# Patient Record
Sex: MALE | Race: BLACK OR AFRICAN AMERICAN | NOT HISPANIC OR LATINO | Employment: FULL TIME | URBAN - METROPOLITAN AREA
[De-identification: names, ages, dates, MRNs, and addresses within clinical notes are randomized per-mention and may not be internally consistent; named-entity substitution may affect disease eponyms.]

---

## 2017-05-05 ENCOUNTER — OFFICE VISIT (OUTPATIENT)
Dept: FAMILY MEDICINE | Facility: CLINIC | Age: 22
End: 2017-05-05
Payer: COMMERCIAL

## 2017-05-05 VITALS
WEIGHT: 183 LBS | BODY MASS INDEX: 24.25 KG/M2 | HEART RATE: 41 BPM | OXYGEN SATURATION: 100 % | DIASTOLIC BLOOD PRESSURE: 64 MMHG | TEMPERATURE: 98 F | SYSTOLIC BLOOD PRESSURE: 127 MMHG | HEIGHT: 73 IN

## 2017-05-05 DIAGNOSIS — M54.50 LOW BACK PAIN WITHOUT SCIATICA, UNSPECIFIED BACK PAIN LATERALITY, UNSPECIFIED CHRONICITY: ICD-10-CM

## 2017-05-05 DIAGNOSIS — Z00.00 ROUTINE GENERAL MEDICAL EXAMINATION AT A HEALTH CARE FACILITY: Primary | ICD-10-CM

## 2017-05-05 DIAGNOSIS — K40.90 UNILATERAL INGUINAL HERNIA WITHOUT OBSTRUCTION OR GANGRENE, RECURRENCE NOT SPECIFIED: ICD-10-CM

## 2017-05-05 PROCEDURE — 99395 PREV VISIT EST AGE 18-39: CPT | Performed by: PHYSICIAN ASSISTANT

## 2017-05-05 NOTE — PROGRESS NOTES
SUBJECTIVE:     CC: Mike Awan is an 22 year old male who presents for preventative health visit.     Healthy Habits:    Do you get at least three servings of calcium containing foods daily (dairy, green leafy vegetables, etc.)? yes    Amount of exercise or daily activities, outside of work: 5 day(s) per week    Problems taking medications regularly No    Medication side effects: No    Have you had an eye exam in the past two years? yes    Do you see a dentist twice per year? no    Do you have sleep apnea, excessive snoring or daytime drowsiness?no            Today's PHQ-2 Score:   PHQ-2 ( 1999 [employer]) 5/5/2017 8/23/2016   Q1: Little interest or pleasure in doing things 0 0   Q2: Feeling down, depressed or hopeless 0 0   PHQ-2 Score 0 0       Abuse: Current or Past(Physical, Sexual or Emotional)- No  Do you feel safe in your environment - Yes    Social History   Substance Use Topics     Smoking status: Never Smoker     Smokeless tobacco: Never Used     Alcohol use No     The patient does not drink >3 drinks per day nor >7 drinks per week.    Last PSA: No results found for: PSA    No results for input(s): CHOL, HDL, LDL, TRIG, CHOLHDLRATIO, NHDL in the last 35010 hours.    Reviewed orders with patient. Reviewed health maintenance and updated orders accordingly - Yes    Reviewed and updated as needed this visit by clinical staff  Tobacco  Allergies  Meds  Med Hx  Surg Hx  Fam Hx  Soc Hx        Reviewed and updated as needed this visit by Provider  Tobacco  Allergies  Meds  Soc Hx           ROS:  CONSTITUTIONAL:intentional weight loss.   I: NEGATIVE for worrisome rashes, moles or lesions  E: NEGATIVE for vision changes or irritation  ENT: NEGATIVE for ear, mouth and throat problems  R: NEGATIVE for significant cough or SOB  CV: NEGATIVE for chest pain, palpitations or peripheral edema  GI: NEGATIVE for nausea, abdominal pain, heartburn, or change in bowel habits   male: negative for dysuria, hematuria,  "decreased urinary stream, erectile dysfunction, urethral discharge  MUSCULOSKELETAL:low back pain on and off  N: NEGATIVE for weakness, dizziness or paresthesias  P: NEGATIVE for changes in mood or affect    Problem list, Medication list, Allergies, and Medical/Social/Surgical histories reviewed in EPIC and updated as appropriate.  OBJECTIVE:     /64 (BP Location: Left arm, Patient Position: Chair, Cuff Size: Adult Regular)  Pulse (!) 41  Temp 98  F (36.7  C) (Oral)  Ht 6' 1.46\" (1.866 m)  Wt 183 lb (83 kg)  SpO2 100%  BMI 23.84 kg/m2  EXAM:  GENERAL: healthy, alert and no distress  EYES: Eyes grossly normal to inspection, PERRL and conjunctivae and sclerae normal  HENT: ear canals and TM's normal, nose and mouth without ulcers or lesions  NECK: no adenopathy, no asymmetry, masses, or scars and thyroid normal to palpation  RESP: lungs clear to auscultation - no rales, rhonchi or wheezes  CV: regular rate and rhythm, normal S1 S2, no S3 or S4, no murmur, click or rub, no peripheral edema and peripheral pulses strong  ABDOMEN: soft, nontender, no hepatosplenomegaly, no masses and bowel sounds normal   (male): testicles normal without atrophy or masses, hernia small left with cough and penis normal without urethral discharge  MS: normal rom of back, negative stl raise, normal DTR knees bilaterally, equal strength in lower extremities, no pain on palpation.    SKIN: no suspicious lesions or rashes  NEURO: Normal strength and tone, mentation intact and speech normal  PSYCH: mentation appears normal, affect normal/bright    ASSESSMENT/PLAN:     1. Routine general medical examination at a health care facility  Overall healthy.  High blood pressure has resolved, likely due to weight loss.      2. Low back pain without sciatica, unspecified back pain laterality, unspecified chronicity  He will consider PT if covered by insurance.    - [facility] CHIR[facility]RACTIC REFERRAL    3. Unilateral inguinal hernia " "without obstruction or gangrene, recurrence not specified  Monitor.  Not bothersome at this time      COUNSELING:  Reviewed preventive health counseling, as reflected in patient instructions       Regular exercise       Healthy diet/nutrition       Safe sex practices/STD prevention         reports that he has never smoked. He has never used smokeless tobacco.    Estimated body mass index is 23.84 kg/(m^2) as calculated from the following:    Height as of this encounter: 6' 1.46\" (1.866 m).    Weight as of this encounter: 183 lb (83 kg).       Counseling Resources:  ATP IV Guidelines  Pooled Cohorts Equation Calculator  FRAX Risk Assessment  ICSI Preventive Guidelines  Dietary Guidelines for Americans, 2010  USDA's MyPlate  ASA Prophylaxis  Lung CA Screening    Criss Helton PA-C  [facility]  "

## 2017-05-05 NOTE — NURSING NOTE
"Chief Complaint   Patient presents with     Physical       Initial /64 (BP Location: Left arm, Patient Position: Chair, Cuff Size: Adult Regular)  Pulse (!) 41  Temp 98  F (36.7  C) (Oral)  Ht 6' 1.46\" (1.866 m)  Wt 183 lb (83 kg)  SpO2 100%  BMI 23.84 kg/m2 Estimated body mass index is 23.84 kg/(m^2) as calculated from the following:    Height as of this encounter: 6' 1.46\" (1.866 m).    Weight as of this encounter: 183 lb (83 kg).  Medication Reconciliation: complete   Selena Dean CMA       "

## 2017-05-05 NOTE — MR AVS SNAPSHOT
After Visit Summary   5/5/2017    Mike Awan    MRN: [id number]           Patient Information     Date Of Birth          1995        Visit Information        Provider Department      5/5/2017 11:20 AM Criss Helton PA-C [facility]        Today's Diagnoses     Routine general medical examination at a health care facility    -  1    Low back pain without sciatica, unspecified back pain laterality, unspecified chronicity        Unilateral inguinal hernia without obstruction or gangrene, recurrence not specified          Care Instructions      Preventive Health Recommendations  Male Ages 18 - 25     Yearly exam:             See your health care provider every year in order to  o   Review health changes.   o   Discuss preventive care.    o   Review your medicines if your doctor has prescribed any.    You should be tested each year for STDs (sexually transmitted diseases).     Talk to your provider about cholesterol testing.      If you are at risk for diabetes, you should have a diabetes test (fasting glucose).    Shots: Get a flu shot each year. Get a tetanus shot every 10 years.     Nutrition:    Eat at least 5 servings of fruits and vegetables daily.     Eat whole-grain bread, whole-wheat pasta and brown rice instead of white grains and rice.     Talk to your provider about calcium and Vitamin D.     Lifestyle    Exercise for at least 150 minutes a week (30 minutes a day, 5 days a week). This will help you control your weight and prevent disease.     Limit alcohol to one drink per day.     No smoking.     Wear sunscreen to prevent skin cancer.     See your dentist every six months for an exam and cleaning.           Follow-ups after your visit        Additional Services     [facility], HAND, AND CHIROPRACTIC REFERRAL       **This order will print in the [facility] Scheduling Office**    Physical Therapy, Hand Therapy and Chiropractic Care are available through:    *[facility]  *[facility]  *[facility]    Call one number to schedule at any of the above locations: [phone].    Your provider has referred you to: Physical Therapy at [facility] or [facility]    Indication/Reason for Referral: Low Back Pain  Onset of Illness: several months  Therapy Orders: Evaluate and Treat  Special Programs: None  Special Request: None    Kay Villanueva      Additional Comments for the Therapist or Chiropractor:     Please be aware that coverage of these services is subject to the terms and limitations of your health insurance plan.  Call member services at your health plan with any benefit or coverage questions.      Please bring the following to your appointment:    *Your personal calendar for scheduling future appointments  *Comfortable clothing                  Who to contact     If you have questions or need follow up information about today's clinic visit or your schedule please contact [facility] directly at [phone].  Normal or non-critical lab and imaging results will be communicated to you by MyChart, letter or phone within 4 business days after the clinic has received the results. If you do not hear from us within 7 days, please contact the clinic through MyChart or phone. If you have a critical or abnormal lab result, we will notify you by phone as soon as possible.  Submit refill requests through [employer] or call your pharmacy and they will forward the refill request to us. Please allow 3 business days for your refill to be completed.          Additional Information About Your Visit        [facility] Information     [employer] lets you send messages to your doctor, view your test results, renew your prescriptions, schedule appointments and more. To sign up, go to [email or web address] . Click on \"Log in\" on the left side of the screen, which will take you to the Welcome page. Then click on \"Sign up Now\" on the right side " "of the page.     You will be asked to enter the access code listed below, as well as some personal information. Please follow the directions to create your username and password.     Your access code is: [email or web address]  Expires: 8/3/2017 12:08 PM     Your access code will  in 90 days. If you need help or a new code, please call your [place] clinic or [phone].        Care EveryWhere ID     This is your Care EveryWhere ID. This could be used by other organizations to access your [place] medical records  [id number]        Your Vitals Were     Pulse Temperature Height Pulse Oximetry BMI (Body Mass Index)       41 98  F (36.7  C) (Oral) 6' 1.46\" (1.866 m) 100% 23.84 kg/m2        Blood Pressure from Last 3 Encounters:   17 127/64   16 122/75   16 144/66    Weight from Last 3 Encounters:   17 183 lb (83 kg)   16 179 lb (81.2 kg)   14 248 lb 8 oz (112.7 kg) (>99 %)*     * Growth percentiles are based on [facility] 2-20 Years data.              We Performed the Following     [facility] PT, HAND, AND CHIROPRACTIC REFERRAL        Primary Care Provider Office Phone # Fax #    Criss Helton PA-C [phone]       [facility] [address] [facility] [address]        Thank you!     Thank you for choosing [facility]  for your care. Our goal is always to provide you with excellent care. Hearing back from our patients is one way we can continue to improve our services. Please take a few minutes to complete the written survey that you may receive in the mail after your visit with us. Thank you!             Your Updated Medication List - Protect others around you: Learn how to safely use, store and throw away your medicines at www.disposemymeds.org.          This list is accurate as of: 17 12:08 PM.  Always use your most recent med list.                   Brand Name Dispense Instructions for use    ibuprofen 600 MG tablet    " ADVIL/MOTRIN    30 tablet    Take 1 tablet (600 mg) by mouth every 8 hours as needed for pain       TYLENOL PO      Take 650 mg by mouth every 8 hours as needed for mild pain or fever Reported on 5/5/2017

## 2017-05-23 ENCOUNTER — THERAPY VISIT (OUTPATIENT)
Dept: PHYSICAL THERAPY | Facility: CLINIC | Age: 22
End: 2017-05-23
Payer: COMMERCIAL

## 2017-05-23 DIAGNOSIS — M54.50 BILATERAL LOW BACK PAIN WITHOUT SCIATICA, UNSPECIFIED CHRONICITY: Primary | ICD-10-CM

## 2017-05-23 PROCEDURE — 97110 THERAPEUTIC EXERCISES: CPT | Mod: GP | Performed by: PHYSICAL THERAPIST

## 2017-05-23 PROCEDURE — 97112 NEUROMUSCULAR REEDUCATION: CPT | Mod: GP | Performed by: PHYSICAL THERAPIST

## 2017-05-23 PROCEDURE — 97161 PT EVAL LOW COMPLEX 20 MIN: CPT | Mod: GP | Performed by: PHYSICAL THERAPIST

## 2017-05-23 NOTE — MR AVS SNAPSHOT
"              After Visit Summary   5/23/2017    Mike Awan    MRN: [id number]           Patient Information     Date Of Birth          1995        Visit Information        Provider Department      5/23/2017 9:50 AM Brandy Palma, PT [facility]        Today's Diagnoses     Bilateral low back pain without sciatica, unspecified chronicity    -  1       Follow-ups after your visit        Your next 10 appointments already scheduled     May 30, 2017  7:50 AM CDT   [facility] with Brandy Palma PT   [facility] ([facility])    [address]  [facility] [address] [phone]              Who to contact     If you have questions or need follow up information about today's clinic visit or your schedule please contact [facility] directly at [phone].  Normal or non-critical lab and imaging results will be communicated to you by MyChart, letter or phone within 4 business days after the clinic has received the results. If you do not hear from us within 7 days, please contact the clinic through MyChart or phone. If you have a critical or abnormal lab result, we will notify you by phone as soon as possible.  Submit refill requests through [employer] or call your pharmacy and they will forward the refill request to us. Please allow 3 business days for your refill to be completed.          Additional Information About Your Visit        MyChart Information     [employer] lets you send messages to your doctor, view your test results, renew your prescriptions, schedule appointments and more. To sign up, go to [email or web address] . Click on \"Log in\" on the left side of the screen, which will take you to the Welcome page. Then click on \"Sign up Now\" on the right side of the page.     You will be asked to enter the access code listed below, as well as some personal information. Please " follow the directions to create your username and password.     Your access code is: [email or web address]  Expires: 8/3/2017 12:08 PM     Your access code will  in 90 days. If you need help or a new code, please call your [place] clinic or [phone].        Care EveryWhere ID     This is your Care EveryWhere ID. This could be used by other organizations to access your [place] medical records  [id number]         Blood Pressure from Last 3 Encounters:   17 127/64   16 122/75   16 144/66    Weight from Last 3 Encounters:   17 83 kg (183 lb)   16 81.2 kg (179 lb)   14 112.7 kg (248 lb 8 oz) (>99 %)*     * Growth percentiles are based on [facility] 2-20 Years data.              We Performed the Following     HC PT EVAL, LOW COMPLEXITY     NEUROMUSCULAR RE-EDUCATION     THERAPEUTIC EXERCISES        Primary Care Provider Office Phone # Fax #    Criss Helton PA-C [phone]       [facility] [address]  [facility] [place] [address]        Thank you!     Thank you for choosing [facility]  for your care. Our goal is always to provide you with excellent care. Hearing back from our patients is one way we can continue to improve our services. Please take a few minutes to complete the written survey that you may receive in the mail after your visit with us. Thank you!             Your Updated Medication List - Protect others around you: Learn how to safely use, store and throw away your medicines at www.disposemymeds.org.          This list is accurate as of: 17 12:28 PM.  Always use your most recent med list.                   Brand Name Dispense Instructions for use    ibuprofen 600 MG tablet    ADVIL/MOTRIN    30 tablet    Take 1 tablet (600 mg) by mouth every 8 hours as needed for pain       TYLENOL PO      Take 650 mg by mouth every 8 hours as needed for mild pain or fever Reported on 2017

## 2017-05-23 NOTE — PROGRESS NOTES
"[facility] Initial Evaluation    Subjective:    Patient is a 22 year old male presenting with rehab back hpi. The history is provided by the patient.   Mike Awan is a 22 year old male with a lumbar condition.  Condition occurred with:  Insidious onset.  Condition occurred: for unknown reasons.  This is a recurrent condition  Pain began about one week ago. Has had recurrent back pain in the past but has not lasted this long. PT referral on 5/5/17.    Patient reports pain:  Lower lumbar spine, central lumbar spine, lumbar spine right and lumbar spine left.  Radiates to:  No radiation.  Pain is described as other (\"tightness, twisting\") and is intermittent and reported as 6/10 (at worst).  Associated symptoms:  Loss of motion/stiffness. Worse during: no pattern.  Symptoms are exacerbated by bending, twisting, sitting, standing, walking, lifting and other (playing basketball, running) and relieved by heat and activity/movement.  Since onset symptoms are unchanged.        General health as reported by patient is good.  Pertinent medical history includes:  Overweight and high blood pressure.  Medical allergies: no.  Other surgeries include:  No.  Current medications:  None as reported by the patient.  Current occupation is student.        Barriers include:  None as reported by the patient.    Red flags:  None as reported by the patient.      Oswestry Score: 26.67 %                 Objective:          Flexibility/Screens:       Lower Extremity:  Decreased left lower extremity flexibility:Hamstrings    Decreased right lower extremity flexibility:  Hamstrings               Lumbar/SI Evaluation  ROM:  AROM Lumbar: normal (pain with extension, relief with flexion)      Lumbar Myotomes:  normal                  Neural Tension/Mobility:  Lumbar:  Normal      Left side:SLR  negative.     Right side:   SLR  negative.   Lumbar Palpation:    Tenderness present at Left:    Quadratus Lumborum; Erector Spinae and " Vertebral  Tenderness not present at Left:    Piriformis or Gluteus Medius  Tenderness present at Right: Quadratus Lumborum; Erector Spinae and Vertebral  Tenderness not present at Right:  Piriformis or Gluteus Medius    Lumbar Provocation:      Left negative with:  PROM hip    Right negative with:  PROM hip    SI joint/Sacrum:          Left negative at:    Thigh thrust and Sacral thrust    Right negative at:  Thigh thrust and Sacral thrust                                      Hip Evaluation  HIP AROM:  AROM:    Left Hip:     Normal    Right Hip:   Normal                    Hip Strength:    Flexion:   Left: 4+/5   Pain:  Right: 4+/5   Pain:                    Extension:  Left: 5-/5  Pain:Right: 4/5    Pain:    Abduction:  Left: 5-/5     Pain:Right: 4/5    Pain:  Adduction:  Left: 5/5    Pain:Right: 5/5   Pain:    External Rotation:  Left: 5-/5   Pain:  Right: 4/5   Pain:  Knee Flexion:  Left: 5/5   Pain:Right: 5/5   Pain:  Knee Extension:  Left: 5/5   Pain:Right: 5/5    Pain:        Hip Special Testing:      Left hip negative for the following special tests:  Serjio  Right hip negative for the following special tests:  Serjio      Functional Testing:          Quad:      Bilateral leg squat:  Excessive anterior knee excursion                  General     ROS    Assessment/Plan:      Patient is a 22 year old male with lumbar complaints.    Patient has the following significant findings with corresponding treatment plan.                Diagnosis 1:  LBP  Pain -  hot/cold therapy, mechanical traction, manual therapy, splint/taping/bracing/orthotics, self management, education, directional preference exercise and home program  Decreased ROM/flexibility - manual therapy, therapeutic exercise and home program  Decreased strength - therapeutic exercise, therapeutic activities and home program    Therapy Evaluation Codes:   1) History comprised of:   Personal factors that impact the plan of care:      None.    Comorbidity  factors that impact the plan of care are:      None.     Medications impacting care: None.  2) Examination of Body Systems comprised of:   Body structures and functions that impact the plan of care:      Hip, Lumbar spine and Thoracic Spine.   Activity limitations that impact the plan of care are:      Jumping, Lifting, Running, Sitting, Sports, Squatting/kneeling, Standing, Walking and Laying down.  3) Clinical presentation characteristics are:   Stable/Uncomplicated.  4) Decision-Making    Low complexity using standardized patient assessment instrument and/or measureable assessment of functional outcome.  Cumulative Therapy Evaluation is: Low complexity.    Previous and current functional limitations:  (See Goal Flow Sheet for this information)    Short term and Long term goals: (See Goal Flow Sheet for this information)     Communication ability:  Patient appears to be able to clearly communicate and understand verbal and written communication and follow directions correctly.  Treatment Explanation - The following has been discussed with the patient:   RX ordered/plan of care  Anticipated outcomes  Possible risks and side effects  This patient would benefit from PT intervention to resume normal activities.   Rehab potential is good.    Frequency:  1 X week, once daily  Duration:  for 6 weeks  Discharge Plan:  Achieve all LTG.  Independent in home treatment program.  Reach maximal therapeutic benefit.    Please refer to the daily flowsheet for treatment today, total treatment time and time spent performing 1:1 timed codes.

## 2017-05-23 NOTE — PROGRESS NOTES
Subjective:    Patient is a 22 year old male presenting with rehab right elbow hpi.                       Objective:    System    Physical Exam    General     ROS    Assessment/Plan:      {REHAB NOTES:393328}

## 2017-05-24 ENCOUNTER — HOSPITAL ENCOUNTER (EMERGENCY)
Facility: CLINIC | Age: 22
Discharge: HOME | End: 2017-05-24
Attending: EMERGENCY MEDICINE | Admitting: EMERGENCY MEDICINE
Payer: COMMERCIAL

## 2017-05-24 VITALS
OXYGEN SATURATION: 99 % | RESPIRATION RATE: 16 BRPM | HEIGHT: 75 IN | BODY MASS INDEX: 22.38 KG/M2 | DIASTOLIC BLOOD PRESSURE: 72 MMHG | SYSTOLIC BLOOD PRESSURE: 126 MMHG | TEMPERATURE: 97.3 F | WEIGHT: 180 LBS | HEART RATE: 56 BPM

## 2017-05-24 DIAGNOSIS — M54.50 ACUTE BILATERAL LOW BACK PAIN WITHOUT SCIATICA: ICD-10-CM

## 2017-05-24 PROCEDURE — 99282 EMERGENCY DEPT VISIT SF MDM: CPT | Performed by: EMERGENCY MEDICINE

## 2017-05-24 PROCEDURE — 99284 EMERGENCY DEPT VISIT MOD MDM: CPT | Mod: Z6 | Performed by: EMERGENCY MEDICINE

## 2017-05-24 RX ORDER — NAPROXEN 500 MG/1
500 TABLET ORAL 2 TIMES DAILY WITH MEALS
Qty: 14 TABLET | Refills: 0 | Status: SHIPPED | OUTPATIENT
Start: 2017-05-24 | End: 2017-05-31

## 2017-05-24 RX ORDER — METHOCARBAMOL 500 MG/1
1000 TABLET, FILM COATED ORAL 3 TIMES DAILY
Qty: 30 TABLET | Refills: 0 | Status: SHIPPED | OUTPATIENT
Start: 2017-05-24 | End: 2017-05-29

## 2017-05-24 NOTE — LETTER
[facility], [place], EMERGENCY DEPARTMENT  [address]  [facility] [address]  [phone]    May 24, 2017    Mike Awan  [address] [facility] [address]  [phone] (home)     : 1995      To Whom it may concern:    Mike Awan was seen in our Emergency Department today, May 24, 2017.  I expect his condition to improve over the next week.  He may return to work/school on 17 but will be unable to lift more than 20 pounds for one week.    Sincerely,        Mohamud Abarca MD

## 2017-05-24 NOTE — ED AVS SNAPSHOT
[facility], [place], Emergency Department    [address]    [place] [address]    Phone:  [phone]    Fax:  [phone]                                       Miek Awan   MRN: [id number]    Department:  [facility], Emergency Department   Date of Visit:  5/24/2017           After Visit Summary Signature Page     I have received my discharge instructions, and my questions have been answered. I have discussed any challenges I see with this plan with the nurse or doctor.    ..........................................................................................................................................  Patient/Patient Representative Signature      ..........................................................................................................................................  Patient Representative Print Name and Relationship to Patient    ..................................................               ................................................  Date                                            Time    ..........................................................................................................................................  Reviewed by Signature/Title    ...................................................              ..............................................  Date                                                            Time

## 2017-05-24 NOTE — ED AVS SNAPSHOT
[facility], Emergency Department    [address]    [place] [address]    Phone:  [phone]    Fax:  [phone]                                       Mike Awan   MRN: [id number]    Department:  [facility], Emergency Department   Date of Visit:  5/24/2017           Patient Information     Date Of Birth          1995        Your diagnoses for this visit were:     Acute bilateral low back pain without sciatica acute/chronic       You were seen by Mohamud Abarca MD.        Discharge Instructions       No lifting more than 20 pounds for one week.    Please make an appointment to follow up with Your Primary Care Provider in one week for recheck.    Consider making an appointment with the [facility] (see attached) if not improving or for any concerns.    Discharge References/Attachments     BACK EXERCISES, LUMBAR (ENGLISH)    BACK AND NECK PAIN, GENERAL (ENGLISH)      Future Appointments        Provider Department Dept Phone Center    5/30/2017 7:50 AM Brandy Palma PT [facility] [phone] [facility]      24 Hour Appointment Hotline       To make an appointment at any [facility], call [phone] ([phone]). If you don't have a family doctor or clinic, we will help you find one. [facility] clinics are conveniently located to serve the needs of you and your family.             Review of your medicines      START taking        Dose / Directions Last dose taken    methocarbamol 500 MG tablet   Commonly known as:  ROBAXIN   Dose:  1000 mg   Quantity:  30 tablet        Take 2 tablets (1,000 mg) by mouth 3 times daily for 5 days   Refills:  0        naproxen 500 MG tablet   Commonly known as:  NAPROSYN   Dose:  500 mg   Quantity:  14 tablet        Take 1 tablet (500 mg) by mouth 2 times daily (with meals) for 7 days   Refills:  0          Our records show that you are taking the medicines listed below. If these are  "incorrect, please call your family doctor or clinic.        Dose / Directions Last dose taken    TYLENOL PO   Dose:  650 mg        Take 650 mg by mouth every 8 hours as needed for mild pain or fever Reported on 5/5/2017   Refills:  0          STOP taking        Dose Reason for stopping Comments    ibuprofen 600 MG tablet   Commonly known as:  ADVIL/MOTRIN                      Prescriptions were sent or printed at these locations (2 Prescriptions)                   Other Prescriptions                Printed at Department/Unit printer (2 of 2)         naproxen (NAPROSYN) 500 MG tablet               methocarbamol (ROBAXIN) 500 MG tablet                Orders Needing Specimen Collection     None      Pending Results     No orders found from 5/22/2017 to 5/25/2017.            Pending Culture Results     No orders found from 5/22/2017 to 5/25/2017.            Pending Results Instructions     If you had any lab results that were not finalized at the time of your Discharge, you can call the ED Lab Result RN at [phone]. You will be contacted by this team for any positive Lab results or changes in treatment. The nurses are available 7 days a week from 10A to 6:30P.  You can leave a message 24 hours per day and they will return your call.        Thank you for choosing [facility]       Thank you for choosing [facility] for your care. Our goal is always to provide you with excellent care. Hearing back from our patients is one way we can continue to improve our services. Please take a few minutes to complete the written survey that you may receive in the mail after you visit with us. Thank you!        [employer] Information     [employer] lets you send messages to your doctor, view your test results, renew your prescriptions, schedule appointments and more. To sign up, go to [email or web address] . Click on \"Log in\" on the left side of the screen, which will take you to the Welcome page. Then click on \"Sign up Now\" on the right " side of the page.     You will be asked to enter the access code listed below, as well as some personal information. Please follow the directions to create your username and password.     Your access code is: [email or web address]  Expires: 8/3/2017 12:08 PM     Your access code will  in 90 days. If you need help or a new code, please call your [facility] clinic or [phone].        Care EveryWhere ID     This is your Care EveryWhere ID. This could be used by other organizations to access your [facility] medical records  [id number]        After Visit Summary       This is your record. Keep this with you and show to your community pharmacist(s) and doctor(s) at your next visit.

## 2017-05-25 NOTE — DISCHARGE INSTRUCTIONS
No lifting more than 20 pounds for one week.    Please make an appointment to follow up with Your Primary Care Provider in one week for recheck.    Consider making an appointment with the [facility] (see attached) if not improving or for any concerns.

## 2017-05-25 NOTE — ED PROVIDER NOTES
History     Chief Complaint   Patient presents with     Back Pain     Pt c/o low back pain without trauma. Pt has had the pain for 5 days but was worse when he woke up this am.     SLADE Awan is a 22 year old male who has had some problems with back pain in his low lumbar back over the last year intermittently.  Patient has been seen by physical therapy in the past and his primary care physician.  Patient states in the last 2 weeks his back pain has been acting out without any definite etiology.  Patient describes the pain in his bilateral low lumbar pain that is nonradiating in nature.  Patient denies any weakness or incontinence associated with this pain.  Patient states he went to see his physical therapist yesterday and was started on some exercises but woke up today with increased back pain.  Patient states that he has used heat and muscle relaxants in the past but is only on ibuprofen currently.    I have reviewed the Medications, Allergies, Past Medical and Surgical History, and Social History in the [employer] system.  Past Medical History:   Diagnosis Date     Hypertension     has lost weight      Previous Medications    ACETAMINOPHEN (TYLENOL PO)    Take 650 mg by mouth every 8 hours as needed for mild pain or fever Reported on 5/5/2017     No Known Allergies  Social History     Social History     Marital status: Single     Spouse name: N/A     Number of children: 0     Years of education: N/A     Occupational History     Not on file.     Social History Main Topics     Smoking status: Never Smoker     Smokeless tobacco: Never Used     Alcohol use No     Drug use: No     Sexual activity: No     Other Topics Concern     Not on file     Social History Narrative     History reviewed. No pertinent surgical history.  Family History   Problem Relation Age of Onset     C.A.D. Father      Hypertension Father      DIABETES Father      Lipids Father      Glaucoma No family hx of      Macular Degeneration No family  "hx of        Review of Systems   All other systems reviewed and are negative.      Physical Exam   BP: 126/72  Pulse: 56  Temp: 97.3  F (36.3  C)  Resp: 16  Height: 190.5 cm (6' 3\")  Weight: 81.6 kg (180 lb)  SpO2: 99 %  Physical Exam   Constitutional: He is oriented to person, place, and time. No distress.   Alert and conversant pleasant   HENT:   Head: Atraumatic.   Eyes: EOM are normal. Pupils are equal, round, and reactive to light.   Musculoskeletal:   Patient has no midline tenderness of his thoracic or lumbar spine.  Patient does have SI joint tenderness bilaterally.   Neurological: He is alert and oriented to person, place, and time.   Grossly intact and symmetric   Skin: Skin is warm.   Psychiatric: He has a normal mood and affect.       ED Course     ED Course     Procedures            Assessments & Plan (with Medical Decision Making)     I have reviewed the nursing notes.    I have reviewed the findings, diagnosis, plan and need for follow up with the patient.    New Prescriptions    METHOCARBAMOL (ROBAXIN) 500 MG TABLET    Take 2 tablets (1,000 mg) by mouth 3 times daily for 5 days    NAPROXEN (NAPROSYN) 500 MG TABLET    Take 1 tablet (500 mg) by mouth 2 times daily (with meals) for 7 days       Final diagnoses:   Acute bilateral low back pain without sciatica - acute/chronic     No lifting more than 20 pounds for one week.  Work note given    Please make an appointment to follow up with Your Primary Care Provider in one week for recheck.    Consider making an appointment with the [facility] (see attached) if not improving or for any concerns.    Routine discharge instructions regarding this diagnosis and back exercises were given.    Mohamud Abarca MD    5/24/2017   [facility], EMERGENCY DEPARTMENT     Mohamud Abarca MD  05/24/17 1930    "

## 2018-10-15 ENCOUNTER — OFFICE VISIT (OUTPATIENT)
Dept: OPTOMETRY | Facility: CLINIC | Age: 23
End: 2018-10-15
Payer: COMMERCIAL

## 2018-10-15 DIAGNOSIS — H52.01 HYPERMETROPIA, RIGHT: ICD-10-CM

## 2018-10-15 DIAGNOSIS — Z01.00 ENCOUNTER FOR EXAMINATION OF EYES AND VISION WITHOUT ABNORMAL FINDINGS: Primary | ICD-10-CM

## 2018-10-15 DIAGNOSIS — H52.223 REGULAR ASTIGMATISM OF BOTH EYES: ICD-10-CM

## 2018-10-15 PROCEDURE — 92015 DETERMINE REFRACTIVE STATE: CPT | Performed by: OPTOMETRIST

## 2018-10-15 PROCEDURE — 92014 COMPRE OPH EXAM EST PT 1/>: CPT | Performed by: OPTOMETRIST

## 2018-10-15 ASSESSMENT — REFRACTION_WEARINGRX
OD_CYLINDER: SPHERE
OS_SPHERE: +0.50
OD_SPHERE: +0.75
OS_CYLINDER: +0.25
OS_AXIS: 090

## 2018-10-15 ASSESSMENT — REFRACTION_MANIFEST
OD_SPHERE: +0.25
OD_AXIS: 037
OS_AXIS: 110
OS_CYLINDER: +0.50
OD_CYLINDER: +0.50
OS_SPHERE: PLANO

## 2018-10-15 ASSESSMENT — VISUAL ACUITY
OD_SC+: -1
OD_SC: 20/25
OS_SC: 20/30 -2
OS_SC+: -1
METHOD: SNELLEN - LINEAR
OS_SC: 20/20
OD_SC: 20/30 -2

## 2018-10-15 ASSESSMENT — TONOMETRY
OD_IOP_MMHG: 18
IOP_METHOD: APPLANATION
OS_IOP_MMHG: 18

## 2018-10-15 ASSESSMENT — CUP TO DISC RATIO
OS_RATIO: 0.55
OD_RATIO: 0.55

## 2018-10-15 ASSESSMENT — CONF VISUAL FIELD
OD_NORMAL: 1
OS_NORMAL: 1

## 2018-10-15 ASSESSMENT — EXTERNAL EXAM - LEFT EYE: OS_EXAM: NORMAL

## 2018-10-15 ASSESSMENT — SLIT LAMP EXAM - LIDS
COMMENTS: NORMAL
COMMENTS: NORMAL

## 2018-10-15 ASSESSMENT — EXTERNAL EXAM - RIGHT EYE: OD_EXAM: NORMAL

## 2018-10-15 NOTE — LETTER
10/15/2018         RE: Mike Awan  [address] [address]        Dear Colleague,    Thank you for referring your patient, Mike Awan, to the [facility]. Please see a copy of my visit note below.    Chief Complaint   Patient presents with     COMPREHENSIVE EYE EXAM      Accompanied by self  Last Eye Exam: 09/06/2016  Dilated Previously: Yes    What are you currently using to see?  Glasses-rx reading glasses, didn't bring       Distance Vision Acuity: Noticed gradual change in both eyes    Near Vision Acuity: Not satisfied     Eye Comfort: burning at times  Do you use eye drops? : No  Occupation or Hobbies: consultant    Criss Winter, Optometric Tech          Medical, surgical and family histories reviewed and updated 10/15/2018.       OBJECTIVE: See Ophthalmology exam    ASSESSMENT:    ICD-10-CM    1. Encounter for examination of eyes and vision without abnormal findings Z01.00    2. Hypermetropia, right H52.01    3. Regular astigmatism of both eyes H52.223       PLAN:     Patient Instructions   Mike was advised of today's exam findings.  Fill glasses prescription  Allow 2 weeks to adapt to change in glasses  Wear glasses during all near/computer work  Copy of glasses Rx provided today.  Return in 1-2 years for eye exam, or sooner if needed.    The affects of the dilating drops last for 4- 6 hours.  You will be more sensitive to light and vision will be blurry up close.  Mydriatic sunglasses were given if needed.      Saurabh Guzman O.D.  [facility]  [address] [place] [address]. [place]  [address]    [phone]           Again, thank you for allowing me to participate in the care of your patient.        Sincerely,        Saurabh Guzman, OD

## 2018-10-15 NOTE — PATIENT INSTRUCTIONS
Mike was advised of today's exam findings.  Fill glasses prescription  Allow 2 weeks to adapt to change in glasses  Wear glasses during all near/computer work  Copy of glasses Rx provided today.  Return in 1-2 years for eye exam, or sooner if needed.    The affects of the dilating drops last for 4- 6 hours.  You will be more sensitive to light and vision will be blurry up close.  Mydriatic sunglasses were given if needed.      Saurabh Guzman O.D.  [facility] [place]  [address] [place] [address]. [place]  [place]  [address]    [phone]

## 2018-10-15 NOTE — PROGRESS NOTES
Chief Complaint   Patient presents with     COMPREHENSIVE EYE EXAM      Accompanied by self  Last Eye Exam: 09/06/2016  Dilated Previously: Yes    What are you currently using to see?  Glasses-rx reading glasses, didn't bring       Distance Vision Acuity: Noticed gradual change in both eyes    Near Vision Acuity: Not satisfied     Eye Comfort: burning at times  Do you use eye drops? : No  Occupation or Hobbies: consultant    Criss Winter, Optometric Tech          Medical, surgical and family histories reviewed and updated 10/15/2018.       OBJECTIVE: See Ophthalmology exam    ASSESSMENT:    ICD-10-CM    1. Encounter for examination of eyes and vision without abnormal findings Z01.00    2. Hypermetropia, right H52.01    3. Regular astigmatism of both eyes H52.223       PLAN:     Patient Instructions   Mike was advised of today's exam findings.  Fill glasses prescription  Allow 2 weeks to adapt to change in glasses  Wear glasses during all near/computer work  Copy of glasses Rx provided today.  Return in 1-2 years for eye exam, or sooner if needed.    The affects of the dilating drops last for 4- 6 hours.  You will be more sensitive to light and vision will be blurry up close.  Mydriatic sunglasses were given if needed.      Saurabh Guzman O.D.  [facility] [place]. [place]  [place]  [address]    [phone]        80

## 2018-10-15 NOTE — MR AVS SNAPSHOT
After Visit Summary   10/15/2018    Mike Awan    MRN: [id number]           Patient Information     Date Of Birth          1995        Visit Information        Provider Department      10/15/2018 8:00 AM Saurabh Guzman, [place] [facility]        Today's Diagnoses     Encounter for examination of eyes and vision without abnormal findings    -  1    Hypermetropia, right        Regular astigmatism of both eyes          Care Instructions    Mike was advised of today's exam findings.  Fill glasses prescription  Allow 2 weeks to adapt to change in glasses  Wear glasses during all near/computer work  Copy of glasses Rx provided today.  Return in 1-2 years for eye exam, or sooner if needed.    The affects of the dilating drops last for 4- 6 hours.  You will be more sensitive to light and vision will be blurry up close.  Mydriatic sunglasses were given if needed.      Saurabh Guzman O.D.  [facility]  [address] [place] [address]. [place]  [place]  [address]    [phone]            Follow-ups after your visit        Follow-up notes from your care team     Return in about 1 year (around 10/15/2019) for Comprehensive Eye Exam.      Who to contact     If you have questions or need follow up information about today's clinic visit or your schedule please contact [facility] directly at [phone].  Normal or non-critical lab and imaging results will be communicated to you by MyChart, letter or phone within 4 business days after the clinic has received the results. If you do not hear from us within 7 days, please contact the clinic through MyChart or phone. If you have a critical or abnormal lab result, we will notify you by phone as soon as possible.  Submit refill requests through [employer] or call your pharmacy and they will forward the refill request to us. Please allow 3 business days for your refill to be completed.          Additional Information About Your Visit         Care EveryWhere ID     This is your Care EveryWhere ID. This could be used by other organizations to access your [facility] medical records  [id number]         Blood Pressure from Last 3 Encounters:   05/24/17 126/72   05/05/17 127/64   08/23/16 122/75    Weight from Last 3 Encounters:   05/24/17 81.6 kg (180 lb)   05/05/17 83 kg (183 lb)   08/23/16 81.2 kg (179 lb)              Today, you had the following     No orders found for display       Primary Care Provider Office Phone # Fax #    Criss Helton PA-C [phone]       [address] [facility] [address]        Equal Access to Services     GILMER GARRETT : Hadii mehdi goinso Sokeisha, waaxda luqadaha, qaybta kaalmada adeegyada, carlitos voss . So [facility] [phone].    ATENCIÓN: Si habla español, tiene a dawson disposición servicios gratuitos de asistencia lingüística. [facility] [phone].    We comply with applicable federal civil rights laws and [place] laws. We do not discriminate on the basis of race, color, national origin, age, disability, sex, sexual orientation, or gender identity.            Thank you!     Thank you for choosing [facility]  for your care. Our goal is always to provide you with excellent care. Hearing back from our patients is one way we can continue to improve our services. Please take a few minutes to complete the written survey that you may receive in the mail after your visit with us. Thank you!             Your Updated Medication List - Protect others around you: Learn how to safely use, store and throw away your medicines at www.disposemymeds.org.          This list is accurate as of 10/15/18  8:40 AM.  Always use your most recent med list.                   Brand Name Dispense Instructions for use Diagnosis    TYLENOL PO      Take 650 mg by mouth every 8 hours as needed for mild pain or fever Reported on 5/5/2017

## 2018-11-28 ENCOUNTER — RADIANT APPOINTMENT (OUTPATIENT)
Dept: GENERAL RADIOLOGY | Facility: CLINIC | Age: 23
End: 2018-11-28
Attending: PHYSICIAN ASSISTANT
Payer: COMMERCIAL

## 2018-11-28 ENCOUNTER — TELEPHONE (OUTPATIENT)
Dept: FAMILY MEDICINE | Facility: CLINIC | Age: 23
End: 2018-11-28

## 2018-11-28 ENCOUNTER — OFFICE VISIT (OUTPATIENT)
Dept: FAMILY MEDICINE | Facility: CLINIC | Age: 23
End: 2018-11-28
Payer: COMMERCIAL

## 2018-11-28 VITALS
DIASTOLIC BLOOD PRESSURE: 74 MMHG | TEMPERATURE: 98.2 F | BODY MASS INDEX: 23.5 KG/M2 | WEIGHT: 189 LBS | SYSTOLIC BLOOD PRESSURE: 127 MMHG | HEIGHT: 75 IN | HEART RATE: 47 BPM

## 2018-11-28 DIAGNOSIS — M79.671 RIGHT FOOT PAIN: Primary | ICD-10-CM

## 2018-11-28 DIAGNOSIS — M79.671 RIGHT FOOT PAIN: ICD-10-CM

## 2018-11-28 PROCEDURE — 73630 X-RAY EXAM OF FOOT: CPT | Mod: RT

## 2018-11-28 PROCEDURE — 99213 OFFICE O/P EST LOW 20 MIN: CPT | Performed by: PHYSICIAN ASSISTANT

## 2018-11-28 NOTE — TELEPHONE ENCOUNTER
Attempt # 1  Called patient at home number.[phone]  Was call answered?  No answer, left message to call nurse line at [phone]    Yoli Todd RN  [employer]

## 2018-11-28 NOTE — TELEPHONE ENCOUNTER
Patient returned call - nurse picked up - relayed below results - Patient verbalized understanding and agreement with plan and had no questions.    Yoli Todd RN  [facility]

## 2018-11-28 NOTE — PROGRESS NOTES
"  SUBJECTIVE:   Mike Awan is a 23 year old male who presents to clinic today for the following health issues:      R foot  pain x 2 weeks, pain is getting worse         Onset: 2 weeks     Description:   Location: R foot   Character: ache and Stabbing    Intensity: 7/10    Progression of Symptoms: worse    Accompanying Signs & Symptoms:  Other symptoms: none    History:   Previous similar pain: no       Precipitating factors:   Trauma or overuse: no     Alleviating factors:  Improved by: nothing    Therapies Tried and outcome: ice   First started as a cramp.  No new shoes.  No new exercise routine.  Worse after activity.  Pain below last toe             Problem list and histories reviewed & adjusted, as indicated.  Additional history: as documented    Patient Active Problem List   Diagnosis     Bilateral low back pain without sciatica, unspecified chronicity     History reviewed. No pertinent surgical history.    Social History   Substance Use Topics     Smoking status: Never Smoker     Smokeless tobacco: Never Used     Alcohol use No     Family History   Problem Relation Age of Onset     C.A.D. Father      Hypertension Father      Diabetes Father      Lipids Father      Glaucoma No family hx of      Macular Degeneration No family hx of            Reviewed and updated as needed this visit by clinical staff  Tobacco  Allergies  Meds  Med Hx  Surg Hx  Fam Hx  Soc Hx      Reviewed and updated as needed this visit by Provider         ROS:  As above    OBJECTIVE:     /74  Pulse (!) 47  Temp 98.2  F (36.8  C) (Oral)  Ht 6' 3\" (1.905 m)  Wt 189 lb (85.7 kg)  BMI 23.62 kg/m2  Body mass index is 23.62 kg/(m^2).  GENERAL: healthy, alert and no distress  MS: normal rom of both ankles and toes, tender to palpation below fifth toe     Xray appears normal-waiting for final read     ASSESSMENT/PLAN:       1. Right foot pain  See podiatry.  Continue to wear tennis shoes and avoid activities that are bothersome  - " XR Foot Right G/E 3 Views; Future  - PODIATRY/FOOT & ANKLE SURGERY REFERRAL        MARIA DEL CARMEN AmaroC  [facility]

## 2018-11-28 NOTE — MR AVS SNAPSHOT
After Visit Summary   11/28/2018    Mike Awan    MRN: [id number]           Patient Information     Date Of Birth          1995        Visit Information        Provider Department      11/28/2018 10:40 AM Criss Helton PA-C [facility]        Today's Diagnoses     Right foot pain    -  1       Follow-ups after your visit        Additional Services     PODIATRY/FOOT & ANKLE SURGERY REFERRAL       Your provider has referred you to: [facility]: [facility] [phone]   [email or web address]    Please be aware that coverage of these services is subject to the terms and limitations of your health insurance plan.  Call member services at your health plan with any benefit or coverage questions.      Please bring the following to your appointment:  >>   Any x-rays, CTs or MRIs which have been performed.  Contact the facility where they were done to arrange for  prior to your scheduled appointment.    >>   List of current medications   >>   This referral request   >>   Any documents/labs given to you for this referral                  Who to contact     If you have questions or need follow up information about today's clinic visit or your schedule please contact [facility] directly at [phone].  Normal or non-critical lab and imaging results will be communicated to you by MyChart, letter or phone within 4 business days after the clinic has received the results. If you do not hear from us within 7 days, please contact the clinic through MyChart or phone. If you have a critical or abnormal lab result, we will notify you by phone as soon as possible.  Submit refill requests through [email or web address] or call your pharmacy and they will forward the refill request to us. Please allow 3 business days for your refill to be completed.          Additional Information About Your Visit        Care  "EveryWhere ID     This is your Care EveryWhere ID. This could be used by other organizations to access your [facility] medical records  [id number]        Your Vitals Were     Pulse Temperature Height BMI (Body Mass Index)          47 98.2  F (36.8  C) (Oral) 6' 3\" (1.905 m) 23.62 kg/m2         Blood Pressure from Last 3 Encounters:   11/28/18 127/74   05/24/17 126/72   05/05/17 127/64    Weight from Last 3 Encounters:   11/28/18 189 lb (85.7 kg)   05/24/17 180 lb (81.6 kg)   05/05/17 183 lb (83 kg)              We Performed the Following     PODIATRY/FOOT & ANKLE SURGERY REFERRAL        Primary Care Provider Office Phone # Fax #    Criss Helton PA-C [phone]       [address] [facility] [address]        Equal Access to Services     IRIS GARRETT : Hadii aad ku hadasho Soomaali, waaxda luqadaha, qaybta kaalmada adeegyada, waxay idiin hayjohnnyn kaylen voss . So [facility] [phone].    ATENCIÓN: Si habla español, tiene a dawson disposición servicios gratuitos de asistencia lingüística. Llame al [phone].    We comply with applicable federal civil rights laws and [place] laws. We do not discriminate on the basis of race, color, national origin, age, disability, sex, sexual orientation, or gender identity.            Thank you!     Thank you for choosing [facility]  for your care. Our goal is always to provide you with excellent care. Hearing back from our patients is one way we can continue to improve our services. Please take a few minutes to complete the written survey that you may receive in the mail after your visit with us. Thank you!             Your Updated Medication List - Protect others around you: Learn how to safely use, store and throw away your medicines at www.disposemymeds.org.          This list is accurate as of 11/28/18 11:25 AM.  Always use your most recent med list.                   Brand Name Dispense Instructions for use " Diagnosis    TYLENOL PO      Take 650 mg by mouth every 8 hours as needed for mild pain or fever Reported on 5/5/2017

## 2018-11-30 ENCOUNTER — OFFICE VISIT (OUTPATIENT)
Dept: PODIATRY | Facility: CLINIC | Age: 23
End: 2018-11-30
Payer: COMMERCIAL

## 2018-11-30 VITALS
SYSTOLIC BLOOD PRESSURE: 130 MMHG | DIASTOLIC BLOOD PRESSURE: 76 MMHG | BODY MASS INDEX: 23.5 KG/M2 | WEIGHT: 188 LBS | HEART RATE: 68 BPM

## 2018-11-30 DIAGNOSIS — L84 CALLUS: ICD-10-CM

## 2018-11-30 DIAGNOSIS — M77.8 CAPSULITIS OF RIGHT FOOT: Primary | ICD-10-CM

## 2018-11-30 PROCEDURE — 11056 PARNG/CUTG B9 HYPRKR LES 2-4: CPT | Performed by: PODIATRIST

## 2018-11-30 PROCEDURE — 99243 OFF/OP CNSLTJ NEW/EST LOW 30: CPT | Mod: 25 | Performed by: PODIATRIST

## 2018-11-30 NOTE — PROGRESS NOTES
S:  Patient seen in consult from Criss Helton and complains of right foot pain.  Points to plantar fourth metatarsal head.  Describes as a burning pain.  aggravated by activity and relieved by rest.  Has had this for 2 weeks.  No pain anywhere else on feet.  Goes barefoot around house.  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  Denies erythema, edema, weakness, numbness.  Denies arthralgias anywhere else.      ROS:  A 10-point review of systems was performed and is positive for that noted in the HPI and as seen above.  All other areas are negative.      No Known Allergies    Current Outpatient Prescriptions   Medication Sig Dispense Refill     Acetaminophen (TYLENOL PO) Take 650 mg by mouth every 8 hours as needed for mild pain or fever Reported on 5/5/2017         Patient Active Problem List   Diagnosis     Bilateral low back pain without sciatica, unspecified chronicity     Elevated troponin I level       Past Medical History:   Diagnosis Date     Hypertension     has lost weight        No past surgical history on file.    Family History   Problem Relation Age of Onset     C.A.D. Father      Hypertension Father      Diabetes Father      Lipids Father      Glaucoma No family hx of      Macular Degeneration No family hx of        Social History   Substance Use Topics     Smoking status: Never Smoker     Smokeless tobacco: Never Used     Alcohol use No         O:   /76  Pulse 68  Wt 188 lb (85.3 kg)  BMI 23.5 kg/m2.      Constitutional/ general:  Pt is in no apparent distress, appears well-nourished.  Cooperative with history and physical exam.     Psych:  The patient answered questions appropriately.  Normal affect.  Seems to have reasonable expectations, in terms of treatment.     Eyes:  Visual scanning/ tracking without deficit.     Ears:  Response to auditory stimuli is normal.  No  hearing aid devices.  Auricles in proper alignment.     Lymphatic:  Popliteal lymph nodes not enlarged.      Lungs:  Non labored breathing, non labored speech. No cough.  No audible wheezing. Even, quiet breathing.       Vascular:  Pedal pulses are palpable bilaterally for both the DP and PT arteries.  CFT < 3 sec.  No edema.  Pedal hair growth noted.     Neuro:  Alert and oriented x 3. Coordinated gait.  Light touch sensation is intact to the L4, L5, S1 distributions. No obvious deficits.  No evidence of neurological-based weakness, spasticity, or contracture in the lower extremities.     Derm: Normal texture and turgor.  No erythema, ecchymosis, or cyanosis.  No open lesions.     Musculoskeletal:    Lower extremity muscle strength is normal.  Patient is ambulatory without an assistive device or brace.  Cavus arch with weightbearing.  No forefoot or rear foot deformities noted.  MS 5/5 all compartments.  Normal ROM all fore foot and rearfoot joints.  No equinus.  Pain under right fourth metatarsal head plantar.  Negative Lachmans test.  Negative Mulders click.  Callus noted right fourth interspace.  No erythema, edema, ecchymosis, or subcutaneous masses noted.      Xrays show short fifth metatarsal, significant metatarsus adductus, but no other bone abnormalities.      A:  Subfourth capsulitis left foot        Left foot callus    P:  X rays personally reviewed.  Discussed cause with patient.  Ice bid.  Instructed to wear stiff soles shoes at all times and I made suggestions.  Good house shoes at all times and I made suggestions.  Avoid activities that bother this and discussed which activities will aggravate.  Callus debrided with a 15 blade.  He will keep this down with a pumice stone at home.  RETURN TO CLINIC PRN.  Thank you for allowing me participate in the care of this patient.        Richy Mckeon DPM, FACFAS

## 2018-11-30 NOTE — MR AVS SNAPSHOT
After Visit Summary   11/30/2018    Mike Awan    MRN: [id number]           Patient Information     Date Of Birth          1995        Visit Information        Provider Department      11/30/2018 8:00 AM Richy Mckeon DPM [facility]        Care Instructions    We wish you continued good healing. If you have any questions or concerns, please do not hesitate to contact us at [phone]    Please remember to call and schedule a follow up appointment if one was recommended at your earliest convenience.   PODIATRY CLINIC HOURS  TELEPHONE NUMBER    Dr. Richy IBRAHIMPANABELL FAC [facility]    Clinics:  [facility]    [facility] Porter [facility]   Tuesday 1PM-6PM  [place] [facility]  Wednesday 7AM-2PM  [place]  Thursday 10AM-6PM  [place]  Friday 7AM-3PM  [place]  Specialty schedulers:   [phone] to make an appointment with any Specialty Provider.        Urgent Care locations:    [facility] Monday-Friday 5 pm - 9 pm. Saturday-Sunday 9 am -5pm    Monday-Friday 11 am - 9 pm Saturday 9 am - 5 pm     Monday-Sunday 12 noon-8PM [phone]     If you need a medication refill, please contact us you may need lab work and/or a follow up visit prior to your refill (i.e. Antifungal medications).    [employer] (secure e-mail communication and access to your chart) to send a message or to make an appointment.    If MRI needed please call [facility] Am[facility] at [phone]     Body mass index is 23.5 kg/(m^2).'                   Follow-ups after your visit        Your next 10 appointments already scheduled     Nov 30, 2018  8:00 AM CST   New Visit with Richy Mckeon DPM   [facility]    [address] [address] [phone]              Who to contact     If you have  questions or need follow up information about today's clinic visit or your schedule please contact [facility] directly at [phone].  Normal or non-critical lab and imaging results will be communicated to you by MyChart, letter or phone within 4 business days after the clinic has received the results. If you do not hear from us within 7 days, please contact the clinic through MyChart or phone. If you have a critical or abnormal lab result, we will notify you by phone as soon as possible.  Submit refill requests through [employer] or call your pharmacy and they will forward the refill request to us. Please allow 3 business days for your refill to be completed.          Additional Information About Your Visit        Care EveryWhere ID     This is your Care EveryWhere ID. This could be used by other organizations to access your [facility] medical records  [id number]        Your Vitals Were     Pulse BMI (Body Mass Index)                68 23.5 kg/m2           Blood Pressure from Last 3 Encounters:   11/30/18 130/76   11/28/18 127/74   05/24/17 126/72    Weight from Last 3 Encounters:   11/30/18 188 lb (85.3 kg)   11/28/18 189 lb (85.7 kg)   05/24/17 180 lb (81.6 kg)              Today, you had the following     No orders found for display       Primary Care Provider Office Phone # Fax #    Criss Helton PA-C [phone]       [address] [facility] [address]        Equal Access to Services     GILMER GARRETT : Hadii mehdi goinso Sokeisha, waaxda luqadaha, qaybta kaalmada adeegyada, carlitos serrano. So [facility] [phone].    ATENCIÓN: Si habla español, tiene a dawson disposición servicios gratuitos de asistencia lingüística. Ilir al [phone].    We comply with applicable federal civil rights laws and [place] laws. We do not discriminate on the basis of race, color, national origin, age, disability, sex, sexual orientation, or gender  identity.            Thank you!     Thank you for choosing [facility]  for your care. Our goal is always to provide you with excellent care. Hearing back from our patients is one way we can continue to improve our services. Please take a few minutes to complete the written survey that you may receive in the mail after your visit with us. Thank you!             Your Updated Medication List - Protect others around you: Learn how to safely use, store and throw away your medicines at www.disposemymeds.org.          This list is accurate as of 11/30/18  7:59 AM.  Always use your most recent med list.                   Brand Name Dispense Instructions for use Diagnosis    TYLENOL PO      Take 650 mg by mouth every 8 hours as needed for mild pain or fever Reported on 5/5/2017

## 2018-11-30 NOTE — PATIENT INSTRUCTIONS
We wish you continued good healing. If you have any questions or concerns, please do not hesitate to contact us at [phone]    Please remember to call and schedule a follow up appointment if one was recommended at your earliest convenience.   PODIATRY CLINIC HOURS  TELEPHONE NUMBER    Dr. Richy IBRAHIMPANABELL [facility]    Clinics:  [facility]    Esme Porter [facility]   Tuesday 1PM-6PM  [place]  Wednesday 7AM-2PM  [facility]  Thursday 10AM-6PM  [place]  Friday 7AM-3PM  [place]  Specialty schedulers:   [phone] to make an appointment with any Specialty Provider.        Urgent Care locations:    [facility] Monday-Friday 5 pm - 9 pm. Saturday-Sunday 9 am -5pm    Monday-Friday 11 am - 9 pm Saturday 9 am - 5 pm     Monday-Sunday 12 noon-8PM [phone]     If you need a medication refill, please contact us you may need lab work and/or a follow up visit prior to your refill (i.e. Antifungal medications).    [facility] [employer] (secure e-mail communication and access to your chart) to send a message or to make an appointment.    If MRI needed please call [facility] Amaro at [phone]     Body mass index is 23.5 kg/(m^2).'

## 2018-11-30 NOTE — LETTER
11/30/2018         RE: Mike Awan  [address] [address]        Dear Colleague,    Thank you for referring your patient, Mike Awan, to the [facility]. Please see a copy of my visit note below.    S:  Patient seen in consult from Criss Helton and complains of right foot pain.  Points to plantar fourth metatarsal head.  Describes as a burning pain.  aggravated by activity and relieved by rest.  Has had this for 2 weeks.  No pain anywhere else on feet.  Goes barefoot around house.  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  Denies erythema, edema, weakness, numbness.  Denies arthralgias anywhere else.      ROS:  A 10-point review of systems was performed and is positive for that noted in the HPI and as seen above.  All other areas are negative.      No Known Allergies    Current Outpatient Prescriptions   Medication Sig Dispense Refill     Acetaminophen (TYLENOL PO) Take 650 mg by mouth every 8 hours as needed for mild pain or fever Reported on 5/5/2017         Patient Active Problem List   Diagnosis     Bilateral low back pain without sciatica, unspecified chronicity     Elevated troponin I level       Past Medical History:   Diagnosis Date     Hypertension     has lost weight        No past surgical history on file.    Family History   Problem Relation Age of Onset     C.A.D. Father      Hypertension Father      Diabetes Father      Lipids Father      Glaucoma No family hx of      Macular Degeneration No family hx of        Social History   Substance Use Topics     Smoking status: Never Smoker     Smokeless tobacco: Never Used     Alcohol use No         O:   /76  Pulse 68  Wt 188 lb (85.3 kg)  BMI 23.5 kg/m2.      Constitutional/ general:  Pt is in no apparent distress, appears well-nourished.  Cooperative with history and physical exam.     Psych:  The patient answered questions appropriately.  Normal affect.  Seems to have reasonable  expectations, in terms of treatment.     Eyes:  Visual scanning/ tracking without deficit.     Ears:  Response to auditory stimuli is normal.  No  hearing aid devices.  Auricles in proper alignment.     Lymphatic:  Popliteal lymph nodes not enlarged.     Lungs:  Non labored breathing, non labored speech. No cough.  No audible wheezing. Even, quiet breathing.       Vascular:  Pedal pulses are palpable bilaterally for both the DP and PT arteries.  CFT < 3 sec.  No edema.  Pedal hair growth noted.     Neuro:  Alert and oriented x 3. Coordinated gait.  Light touch sensation is intact to the L4, L5, S1 distributions. No obvious deficits.  No evidence of neurological-based weakness, spasticity, or contracture in the lower extremities.     Derm: Normal texture and turgor.  No erythema, ecchymosis, or cyanosis.  No open lesions.     Musculoskeletal:    Lower extremity muscle strength is normal.  Patient is ambulatory without an assistive device or brace.  Cavus arch with weightbearing.  No forefoot or rear foot deformities noted.  MS 5/5 all compartments.  Normal ROM all fore foot and rearfoot joints.  No equinus.  Pain under right fourth metatarsal head plantar.  Negative Lachmans test.  Negative Mulders click.  Callus noted right fourth interspace.  No erythema, edema, ecchymosis, or subcutaneous masses noted.      Xrays show short fifth metatarsal, significant metatarsus adductus, but no other bone abnormalities.      A:  Subfourth capsulitis left foot        Left foot callus    P:  X rays personally reviewed.  Discussed cause with patient.  Ice bid.  Instructed to wear stiff soles shoes at all times and I made suggestions.  Good house shoes at all times and I made suggestions.  Avoid activities that bother this and discussed which activities will aggravate.  Callus debrided with a 15 blade.  He will keep this down with a pumice stone at home.  RETURN TO CLINIC PRN.  Thank you for allowing me participate in the care of  this patient.        Richy Mckoen DPM, FACFAS      Again, thank you for allowing me to participate in the care of your patient.        Sincerely,        Richy Mckeon DPM

## 2019-02-05 PROBLEM — M54.50 BILATERAL LOW BACK PAIN WITHOUT SCIATICA, UNSPECIFIED CHRONICITY: Status: RESOLVED | Noted: 2017-05-23 | Resolved: 2019-02-05

## 2019-02-05 NOTE — PROGRESS NOTES
Initial evaluation only. Pt did not return for follow up appointments. Discharge from physical therapy.

## 2019-12-03 ENCOUNTER — APPOINTMENT (OUTPATIENT)
Dept: GENERAL RADIOLOGY | Facility: CLINIC | Age: 24
End: 2019-12-03
Attending: EMERGENCY MEDICINE
Payer: COMMERCIAL

## 2019-12-03 ENCOUNTER — HOSPITAL ENCOUNTER (EMERGENCY)
Facility: CLINIC | Age: 24
Discharge: HOME | End: 2019-12-04
Attending: EMERGENCY MEDICINE | Admitting: EMERGENCY MEDICINE
Payer: COMMERCIAL

## 2019-12-03 VITALS
SYSTOLIC BLOOD PRESSURE: 160 MMHG | TEMPERATURE: 98.1 F | RESPIRATION RATE: 16 BRPM | DIASTOLIC BLOOD PRESSURE: 106 MMHG | OXYGEN SATURATION: 100 %

## 2019-12-03 DIAGNOSIS — S93.401A SPRAIN OF RIGHT ANKLE, UNSPECIFIED LIGAMENT, INITIAL ENCOUNTER: ICD-10-CM

## 2019-12-03 PROCEDURE — 99283 EMERGENCY DEPT VISIT LOW MDM: CPT

## 2019-12-03 PROCEDURE — 73610 X-RAY EXAM OF ANKLE: CPT | Mod: RT

## 2019-12-03 ASSESSMENT — ENCOUNTER SYMPTOMS
ARTHRALGIAS: 1
FEVER: 0

## 2019-12-03 NOTE — ED AVS SNAPSHOT
[facility] Emergency Department  [address]  [facility] [address]  Phone:  [phone]  Fax:  [phone]                                    Mike Awan   MRN: [id number]    Department:  [facility] Emergency Department   Date of Visit:  12/3/2019           After Visit Summary Signature Page    I have received my discharge instructions, and my questions have been answered. I have discussed any challenges I see with this plan with the nurse or doctor.    ..........................................................................................................................................  Patient/Patient Representative Signature      ..........................................................................................................................................  Patient Representative Print Name and Relationship to Patient    ..................................................               ................................................  Date                                   Time    ..........................................................................................................................................  Reviewed by Signature/Title    ...................................................              ..............................................  Date                                               Time          22EPIC Rev 08/18

## 2019-12-04 ASSESSMENT — ENCOUNTER SYMPTOMS: JOINT SWELLING: 1

## 2019-12-04 NOTE — ED PROVIDER NOTES
History     Chief Complaint:  Ankle Pain    The history is provided by the patient.      Mike Awan is a 24 year old otherwise healthy male who presents to the emergency department today for evaluation of right ankle pain. The patient reports the lateral side of his right ankle has been sore for the past week or so, this afternoon the pain got worse and he noticed swelling in the joint. He used ice on the area which did provide temporary relief of the swelling. He states he has been playing basketball since the pain started a week ago, as the pain had not been significant until today. The patient cannot recall any recent trauma from basketball or otherwise. He states he has sprained an ankle before but is unsure of the laterality. The patient denies any recent fevers.    Allergies:  No Known Drug Allergies     Medications:    Medications reviewed. No pertinent medications.    Past Medical History:    Hypertension  Left ventricular hypertrophy     Past Surgical History:    Surgical history reviewed. No pertinent surgical history.    Family History:    Coronary artery disease- Father  Hypertension  Diabetes  Lipids     Social History:  The patient was accompanied to the ED.  Smoking Status: Never Smoker  Smokeless Tobacco: Never Used  Alcohol Use: Negative  Drug Use: Negative    Marital Status:  Single     Review of Systems   Constitutional: Negative for fever.   Musculoskeletal: Positive for arthralgias (ankle, right) and joint swelling (ankle, right).   All other systems reviewed and are negative.    Physical Exam     Patient Vitals for the past 24 hrs:   BP Temp Temp src Heart Rate Resp SpO2   12/03/19 2200 (!) 160/106 98.1  F (36.7  C) Oral 50 16 100 %      Physical Exam    General: Patient is alert and interactive when I enter the room  Head:  The scalp, face, and head appear normal  Eyes:  Conjunctivae are normal  ENT:    The nose is normal    Pinnae are normal    External acoustic canals are  normal  Neck:  Trachea midline  CV:  Pulses are normal.    Resp:  No respiratory distress   Abdomen:      Soft, non-tender, non-distended  Musc:  Normal muscular tone    No major joint effusions    Lateral ankle swelling, no warmth or erythema    Pain on lateral ankle with ROM     No laceration or abrasion    No medial ankle tenderness   Skin:  No rash or lesions noted  Neuro:  Speech is normal and fluent. Face is symmetric.     Moving all extremities well.   Psych: Awake. Alert.  Normal affect.  Appropriate interactions.       Emergency Department Course     Imaging:  Radiology findings were communicated with the patient who voiced understanding of the findings.  XR, Right Ankle, G/E 3 Views  Minimal soft tissue swelling overlying the lateral malleolus. No fracture or dislocation.  Reading per radiology    Emergency Department Course:  2155 Nursing notes and vitals reviewed.  2235 The patient was sent for an x-ray while in the emergency department, results above.    2331 I performed an exam of the patient as documented above. Discussed x-ray imaging, diagnoses, and follow up plans.  2345 Nursing staff placed the patient in a gel splint here in the emergency department.    Findings and plan explained to the Patient. Patient discharged home with instructions regarding supportive care, medications, and reasons to return. The importance of close follow-up was reviewed.     I personally reviewed the imaging results with the Patient and answered all related questions prior to discharge.      Impression & Plan      Medical Decision Making:    Mike Awan is a 24 year old male who presents for evaluation of ankle pain.  Signs and symptoms are consistent with an ankle sprain, a radiograph of the ankle is negative for acute bony injury.  No convincing evidence of high ankle sprain in the ED.  No signs of septic arthritis, gout, pseudogout, fracture, cellulitis, etc.  The patients neurovascular status is normal.  This could  be a atraumatic ankle swelling however he does admit to continued playing sports including basketball despite his ankle pain.  Thus this could be traumatic specially with his swelling being localized to the lateral aspect of his ankle.  He has no risk factors for septic arthritis and he is afebrile.  We discussed return precautions in the setting of not being completely sure this was traumatic.  Patient felt very comfortable this plan.  He can also follow-up with orthopedics as well.  Patient was provided a gel splint here in the emergency department. Plan is for protected weightbearing, RICE treatment.They will begin gentle ROM exercises of the ankle.       Diagnosis:    ICD-10-CM    1. Sprain of right ankle, unspecified ligament, initial encounter S93.401A        Disposition:  The patient is discharged to home.     Scribe Disclosure:  Sydney NATARAJAN, am serving as a scribe at 10:04 PM on 12/3/2019 to document services personally performed by Libra Vanessa MD based on my observations and the provider's statements to me.    12/3/2019   [facility] EMERGENCY DEPARTMENT       Libra Vanessa MD  12/04/19 0709

## 2019-12-04 NOTE — ED TRIAGE NOTES
Patient arrives with complaints of right ankle pain and swelling. States he has had pain in this ankle for a while but the swelling began suddenly today. Edema noted to lateral aspect of of right ankle. Worsening pain with dorsiflexion. CMS in tact.

## 2022-10-06 ENCOUNTER — OFFICE VISIT (OUTPATIENT)
Dept: FAMILY MEDICINE | Facility: CLINIC | Age: 27
End: 2022-10-06
Payer: COMMERCIAL

## 2022-10-06 VITALS
DIASTOLIC BLOOD PRESSURE: 82 MMHG | TEMPERATURE: 97.8 F | SYSTOLIC BLOOD PRESSURE: 132 MMHG | WEIGHT: 195.4 LBS | HEIGHT: 74 IN | HEART RATE: 51 BPM | OXYGEN SATURATION: 100 % | BODY MASS INDEX: 25.08 KG/M2

## 2022-10-06 DIAGNOSIS — Z00.00 ROUTINE GENERAL MEDICAL EXAMINATION AT A HEALTH CARE FACILITY: Primary | ICD-10-CM

## 2022-10-06 DIAGNOSIS — Z11.4 SCREENING FOR HIV (HUMAN IMMUNODEFICIENCY VIRUS): ICD-10-CM

## 2022-10-06 DIAGNOSIS — S39.012A STRAIN OF LUMBAR REGION, INITIAL ENCOUNTER: ICD-10-CM

## 2022-10-06 DIAGNOSIS — Z11.59 NEED FOR HEPATITIS C SCREENING TEST: ICD-10-CM

## 2022-10-06 DIAGNOSIS — Z13.220 ENCOUNTER FOR LIPID SCREENING FOR CARDIOVASCULAR DISEASE: ICD-10-CM

## 2022-10-06 DIAGNOSIS — Z83.3 FAMILY HISTORY OF DIABETES MELLITUS: ICD-10-CM

## 2022-10-06 DIAGNOSIS — Z13.6 ENCOUNTER FOR LIPID SCREENING FOR CARDIOVASCULAR DISEASE: ICD-10-CM

## 2022-10-06 LAB
ALBUMIN SERPL-MCNC: 4.4 G/DL (ref 3.4–5)
ALP SERPL-CCNC: 53 U/L (ref 40–150)
ALT SERPL W P-5'-P-CCNC: 28 U/L (ref 0–70)
ANION GAP SERPL CALCULATED.3IONS-SCNC: 4 MMOL/L (ref 3–14)
AST SERPL W P-5'-P-CCNC: 29 U/L (ref 0–45)
BILIRUB SERPL-MCNC: 2.2 MG/DL (ref 0.2–1.3)
BUN SERPL-MCNC: 14 MG/DL (ref 7–30)
CALCIUM SERPL-MCNC: 9.6 MG/DL (ref 8.5–10.1)
CHLORIDE BLD-SCNC: 104 MMOL/L (ref 94–109)
CHOLEST SERPL-MCNC: 178 MG/DL
CO2 SERPL-SCNC: 28 MMOL/L (ref 20–32)
CREAT SERPL-MCNC: 1.04 MG/DL (ref 0.66–1.25)
FASTING STATUS PATIENT QL REPORTED: YES
GFR SERPL CREATININE-BSD FRML MDRD: >90 ML/MIN/1.73M2
GLUCOSE BLD-MCNC: 89 MG/DL (ref 70–99)
HBA1C MFR BLD: 5 % (ref 0–5.6)
HDLC SERPL-MCNC: 71 MG/DL
LDLC SERPL CALC-MCNC: 96 MG/DL
NONHDLC SERPL-MCNC: 107 MG/DL
POTASSIUM BLD-SCNC: 4.2 MMOL/L (ref 3.4–5.3)
PROT SERPL-MCNC: 8 G/DL (ref 6.8–8.8)
SODIUM SERPL-SCNC: 136 MMOL/L (ref 133–144)
TRIGL SERPL-MCNC: 57 MG/DL

## 2022-10-06 PROCEDURE — 80053 COMPREHEN METABOLIC PANEL: CPT | Performed by: FAMILY MEDICINE

## 2022-10-06 PROCEDURE — 83036 HEMOGLOBIN GLYCOSYLATED A1C: CPT | Performed by: FAMILY MEDICINE

## 2022-10-06 PROCEDURE — 99213 OFFICE O/P EST LOW 20 MIN: CPT | Mod: 25 | Performed by: FAMILY MEDICINE

## 2022-10-06 PROCEDURE — 80061 LIPID PANEL: CPT | Performed by: FAMILY MEDICINE

## 2022-10-06 PROCEDURE — 36415 COLL VENOUS BLD VENIPUNCTURE: CPT | Performed by: FAMILY MEDICINE

## 2022-10-06 PROCEDURE — 99385 PREV VISIT NEW AGE 18-39: CPT | Performed by: FAMILY MEDICINE

## 2022-10-06 ASSESSMENT — ENCOUNTER SYMPTOMS
CONSTIPATION: 0
WEAKNESS: 0
NERVOUS/ANXIOUS: 0
FREQUENCY: 0
CHILLS: 0
ARTHRALGIAS: 0
SHORTNESS OF BREATH: 0
EYE PAIN: 0
PALPITATIONS: 0
SORE THROAT: 0
DIARRHEA: 0
COUGH: 0
PARESTHESIAS: 0
DYSURIA: 0
HEMATURIA: 0
HEADACHES: 0
NAUSEA: 0
HEMATOCHEZIA: 0
DIZZINESS: 0
ABDOMINAL PAIN: 0
FEVER: 0
MYALGIAS: 0

## 2022-10-06 NOTE — PROGRESS NOTES
SUBJECTIVE:   CC: Mike is an 27 year old who presents for preventative health visit.   Patient has been advised of split billing requirements and indicates understanding: Yes  Healthy Habits:     Getting at least 3 servings of Calcium per day:  Yes    Bi-annual eye exam:  NO    Dental care twice a year:  Yes    Sleep apnea or symptoms of sleep apnea:  None    Diet:  Regular (no restrictions)    Frequency of exercise:  4-5 days/week    Duration of exercise:  45-60 minutes    Taking medications regularly:  Not Applicable    Barriers to taking medications:  Not applicable    Medication side effects:  Not applicable    PHQ-2 Total Score: 0    Additional concerns today:  Yes (Routine blood work, lower left back/hip pain)    Patient presents for physical.  Patient is a chronic intermittent lower back pain on the left side.  Pain often radiates to the left hip and groin area.  Patient plays basketball pretty frequently, notices pain after playing  No pain radiation down the back of the leg.  Patient does not take any medications for it.    Wt Readings from Last 4 Encounters:   10/06/22 88.6 kg (195 lb 6.4 oz)   11/30/18 85.3 kg (188 lb)   11/28/18 85.7 kg (189 lb)   05/24/17 81.6 kg (180 lb)           Today's PHQ-2 Score:   PHQ-2 ( 1999 [employer]) 10/6/2022   Q1: Little interest or pleasure in doing things 0   Q2: Feeling down, depressed or hopeless 0   PHQ-2 Score 0   PHQ-2 Total Score (12-17 Years)- Positive if 3 or more points; Administer PHQ-A if positive -   Q1: Little interest or pleasure in doing things Not at all   Q2: Feeling down, depressed or hopeless Not at all   PHQ-2 Score 0       Abuse: Current or Past(Physical, Sexual or Emotional)- No  Do you feel safe in your environment? Yes    Have you ever done Advance Care Planning? (For example, a Health Directive, POLST, or a discussion with a medical provider or your loved ones about your wishes): No, advance care planning information given to patient to review.   "Patient declined advance care planning discussion at this time.    Social History     Tobacco Use     Smoking status: Never Smoker     Smokeless tobacco: Never Used   Substance Use Topics     Alcohol use: No     If you drink alcohol do you typically have >3 drinks per day or >7 drinks per week? No    Alcohol Use 10/6/2022   Prescreen: >3 drinks/day or >7 drinks/week? Not Applicable   Prescreen: >3 drinks/day or >7 drinks/week? -   No flowsheet data found.    Last PSA: No results found for: PSA    Reviewed orders with patient. Reviewed health maintenance and updated orders accordingly - Yes  BP Readings from Last 3 Encounters:   10/06/22 132/82   12/03/19 (!) 160/106   11/30/18 130/76    Wt Readings from Last 3 Encounters:   10/06/22 88.6 kg (195 lb 6.4 oz)   11/30/18 85.3 kg (188 lb)   11/28/18 85.7 kg (189 lb)                    Reviewed and updated as needed this visit by clinical staff   Tobacco  Allergies  Meds                Reviewed and updated as needed this visit by Provider                       Review of Systems   Constitutional: Negative for chills and fever.   HENT: Negative for congestion, ear pain and sore throat.    Eyes: Negative for pain and visual disturbance.   Respiratory: Negative for cough and shortness of breath.    Cardiovascular: Negative for chest pain and palpitations.   Gastrointestinal: Negative for abdominal pain, constipation, diarrhea, hematochezia and nausea.   Genitourinary: Negative for dysuria, frequency, genital sores, hematuria and urgency.   Musculoskeletal: Negative for arthralgias and myalgias.   Skin: Negative for rash.   Neurological: Negative for dizziness, weakness, headaches and paresthesias.   Psychiatric/Behavioral: Negative for mood changes. The patient is not nervous/anxious.          OBJECTIVE:   /82   Pulse 51   Temp 97.8  F (36.6  C) (Oral)   Ht 1.868 m (6' 1.54\")   Wt 88.6 kg (195 lb 6.4 oz)   SpO2 100%   BMI 25.40 kg/m      Physical " Exam  Constitutional:       General: He is not in acute distress.     Appearance: He is well-developed.   HENT:      Right Ear: Tympanic membrane and external ear normal.      Left Ear: Tympanic membrane and external ear normal.      Nose: Nose normal.      Mouth/Throat:      Mouth: No oral lesions.      Pharynx: No oropharyngeal exudate.   Eyes:      General:         Right eye: No discharge.         Left eye: No discharge.      Conjunctiva/sclera: Conjunctivae normal.      Pupils: Pupils are equal, round, and reactive to light.   Neck:      Thyroid: No thyromegaly.      Trachea: No tracheal deviation.   Cardiovascular:      Rate and Rhythm: Normal rate and regular rhythm.      Pulses: Normal pulses.      Heart sounds: Normal heart sounds, S1 normal and S2 normal. No murmur heard.    No S3 or S4 sounds.   Pulmonary:      Effort: Pulmonary effort is normal. No respiratory distress.      Breath sounds: Normal breath sounds. No wheezing or rales.   Abdominal:      General: Bowel sounds are normal.      Palpations: Abdomen is soft. There is no mass.      Tenderness: There is no abdominal tenderness.   Genitourinary:     Testes: Normal.      Comments: No inguinal hernia  Musculoskeletal:         General: No deformity. Normal range of motion.      Cervical back: Neck supple.      Comments: Normal exam of the lower back, (-)YOBANI, FADIR, (-) straight leg raise, no pain with hip rotation or resisted hip flexion   Lymphadenopathy:      Cervical: No cervical adenopathy.   Skin:     General: Skin is warm and dry.      Findings: No lesion or rash.   Neurological:      Mental Status: He is alert and oriented to person, place, and time.      Motor: No abnormal muscle tone.      Deep Tendon Reflexes: Reflexes are normal and symmetric.   Psychiatric:         Speech: Speech normal.         Thought Content: Thought content normal.         Judgment: Judgment normal.               ASSESSMENT/PLAN:       ICD-10-CM    1. Routine general  "medical examination at a health care facility  Z00.00 Lipid panel reflex to direct LDL Fasting     Hemoglobin A1c     Comprehensive metabolic panel     Lipid panel reflex to direct LDL Fasting     Hemoglobin A1c     Comprehensive metabolic panel   2. Screening for HIV (human immunodeficiency virus)  Z11.4    3. Need for hepatitis C screening test  Z11.59    4. Encounter for lipid screening for cardiovascular disease  Z13.220 Lipid panel reflex to direct LDL Fasting    Z13.6 Comprehensive metabolic panel     Lipid panel reflex to direct LDL Fasting     Comprehensive metabolic panel   5. Family history of diabetes mellitus  Z83.3 Hemoglobin A1c     Hemoglobin A1c   6. Strain of lumbar region, initial encounter  S39.012A Physical Therapy Referral   Recommend conservative therapy with physical therapy exercises, stretching.     Patient has been advised of split billing requirements and indicates understanding: Yes    COUNSELING:   Reviewed preventive health counseling, as reflected in patient instructions       Regular exercise       Healthy diet/nutrition    Estimated body mass index is 25.4 kg/m  as calculated from the following:    Height as of this encounter: 1.868 m (6' 1.54\").    Weight as of this encounter: 88.6 kg (195 lb 6.4 oz).     Weight management plan: Discussed healthy diet and exercise guidelines    He reports that he has never smoked. He has never used smokeless tobacco.      Counseling Resources:  ATP IV Guidelines  Pooled Cohorts Equation Calculator  FRAX Risk Assessment  ICSI Preventive Guidelines  Dietary Guidelines for Americans, 2010  USDA's MyPlate  ASA Prophylaxis  Lung CA Screening    Toan Baxter MD  [facility]  "

## 2023-01-14 ENCOUNTER — HEALTH MAINTENANCE LETTER (OUTPATIENT)
Age: 28
End: 2023-01-14

## 2023-12-03 ENCOUNTER — HEALTH MAINTENANCE LETTER (OUTPATIENT)
Age: 28
End: 2023-12-03

## 2024-12-28 ENCOUNTER — APPOINTMENT (OUTPATIENT)
Dept: GENERAL RADIOLOGY | Facility: CLINIC | Age: 29
End: 2024-12-28
Attending: EMERGENCY MEDICINE
Payer: COMMERCIAL

## 2024-12-28 ENCOUNTER — HOSPITAL ENCOUNTER (EMERGENCY)
Facility: CLINIC | Age: 29
Discharge: HOME | End: 2024-12-28
Attending: EMERGENCY MEDICINE | Admitting: EMERGENCY MEDICINE
Payer: COMMERCIAL

## 2024-12-28 VITALS
WEIGHT: 230 LBS | OXYGEN SATURATION: 100 % | SYSTOLIC BLOOD PRESSURE: 129 MMHG | RESPIRATION RATE: 14 BRPM | HEART RATE: 61 BPM | BODY MASS INDEX: 28.6 KG/M2 | HEIGHT: 75 IN | DIASTOLIC BLOOD PRESSURE: 82 MMHG | TEMPERATURE: 97.9 F

## 2024-12-28 DIAGNOSIS — S43.004A SHOULDER DISLOCATION, RIGHT, INITIAL ENCOUNTER: ICD-10-CM

## 2024-12-28 LAB
ANION GAP SERPL CALCULATED.3IONS-SCNC: 15 MMOL/L (ref 7–15)
BASOPHILS # BLD AUTO: 0 10E3/UL (ref 0–0.2)
BASOPHILS NFR BLD AUTO: 1 %
BUN SERPL-MCNC: 14 MG/DL (ref 6–20)
CALCIUM SERPL-MCNC: 9.8 MG/DL (ref 8.8–10.4)
CHLORIDE SERPL-SCNC: 103 MMOL/L (ref 98–107)
CREAT SERPL-MCNC: 1.03 MG/DL (ref 0.67–1.17)
EGFRCR SERPLBLD CKD-EPI 2021: >90 ML/MIN/1.73M2
EOSINOPHIL # BLD AUTO: 0.1 10E3/UL (ref 0–0.7)
EOSINOPHIL NFR BLD AUTO: 2 %
ERYTHROCYTE [DISTWIDTH] IN BLOOD BY AUTOMATED COUNT: 12.6 % (ref 10–15)
GLUCOSE SERPL-MCNC: 99 MG/DL (ref 70–99)
HCO3 SERPL-SCNC: 24 MMOL/L (ref 22–29)
HCT VFR BLD AUTO: 47.5 % (ref 40–53)
HGB BLD-MCNC: 16.5 G/DL (ref 13.3–17.7)
IMM GRANULOCYTES # BLD: 0 10E3/UL
IMM GRANULOCYTES NFR BLD: 0 %
LYMPHOCYTES # BLD AUTO: 3.1 10E3/UL (ref 0.8–5.3)
LYMPHOCYTES NFR BLD AUTO: 56 %
MCH RBC QN AUTO: 30.8 PG (ref 26.5–33)
MCHC RBC AUTO-ENTMCNC: 34.7 G/DL (ref 31.5–36.5)
MCV RBC AUTO: 89 FL (ref 78–100)
MONOCYTES # BLD AUTO: 0.3 10E3/UL (ref 0–1.3)
MONOCYTES NFR BLD AUTO: 6 %
NEUTROPHILS # BLD AUTO: 1.9 10E3/UL (ref 1.6–8.3)
NEUTROPHILS NFR BLD AUTO: 35 %
NRBC # BLD AUTO: 0 10E3/UL
NRBC BLD AUTO-RTO: 0 /100
PLATELET # BLD AUTO: 246 10E3/UL (ref 150–450)
POTASSIUM SERPL-SCNC: 4.6 MMOL/L (ref 3.4–5.3)
RBC # BLD AUTO: 5.36 10E6/UL (ref 4.4–5.9)
SODIUM SERPL-SCNC: 142 MMOL/L (ref 135–145)
WBC # BLD AUTO: 5.4 10E3/UL (ref 4–11)

## 2024-12-28 PROCEDURE — 99284 EMERGENCY DEPT VISIT MOD MDM: CPT | Mod: 25 | Performed by: EMERGENCY MEDICINE

## 2024-12-28 PROCEDURE — 250N000011 HC RX IP 250 OP 636: Performed by: EMERGENCY MEDICINE

## 2024-12-28 PROCEDURE — 23650 CLTX SHO DSLC W/MNPJ WO ANES: CPT | Mod: RT | Performed by: EMERGENCY MEDICINE

## 2024-12-28 PROCEDURE — 96361 HYDRATE IV INFUSION ADD-ON: CPT | Performed by: EMERGENCY MEDICINE

## 2024-12-28 PROCEDURE — 96375 TX/PRO/DX INJ NEW DRUG ADDON: CPT | Performed by: EMERGENCY MEDICINE

## 2024-12-28 PROCEDURE — 250N000013 HC RX MED GY IP 250 OP 250 PS 637: Performed by: EMERGENCY MEDICINE

## 2024-12-28 PROCEDURE — 999N000065 XR SHOULDER RIGHT G/E 3 VIEWS: Mod: RT

## 2024-12-28 PROCEDURE — 73030 X-RAY EXAM OF SHOULDER: CPT | Mod: RT

## 2024-12-28 PROCEDURE — 82374 ASSAY BLOOD CARBON DIOXIDE: CPT | Performed by: EMERGENCY MEDICINE

## 2024-12-28 PROCEDURE — 82565 ASSAY OF CREATININE: CPT | Performed by: EMERGENCY MEDICINE

## 2024-12-28 PROCEDURE — 96374 THER/PROPH/DIAG INJ IV PUSH: CPT | Performed by: EMERGENCY MEDICINE

## 2024-12-28 PROCEDURE — 85048 AUTOMATED LEUKOCYTE COUNT: CPT | Performed by: EMERGENCY MEDICINE

## 2024-12-28 PROCEDURE — 85004 AUTOMATED DIFF WBC COUNT: CPT | Performed by: EMERGENCY MEDICINE

## 2024-12-28 PROCEDURE — 36415 COLL VENOUS BLD VENIPUNCTURE: CPT | Performed by: EMERGENCY MEDICINE

## 2024-12-28 PROCEDURE — 258N000003 HC RX IP 258 OP 636: Performed by: EMERGENCY MEDICINE

## 2024-12-28 PROCEDURE — 80048 BASIC METABOLIC PNL TOTAL CA: CPT | Performed by: EMERGENCY MEDICINE

## 2024-12-28 PROCEDURE — 96376 TX/PRO/DX INJ SAME DRUG ADON: CPT | Performed by: EMERGENCY MEDICINE

## 2024-12-28 RX ORDER — IBUPROFEN 600 MG/1
600 TABLET, FILM COATED ORAL ONCE
Status: COMPLETED | OUTPATIENT
Start: 2024-12-28 | End: 2024-12-28

## 2024-12-28 RX ORDER — ONDANSETRON 2 MG/ML
4 INJECTION INTRAMUSCULAR; INTRAVENOUS EVERY 30 MIN PRN
Status: DISCONTINUED | OUTPATIENT
Start: 2024-12-28 | End: 2024-12-28 | Stop reason: HOSPADM

## 2024-12-28 RX ORDER — OXYCODONE HYDROCHLORIDE 5 MG/1
5 TABLET ORAL ONCE
Status: COMPLETED | OUTPATIENT
Start: 2024-12-28 | End: 2024-12-28

## 2024-12-28 RX ORDER — PROPOFOL 10 MG/ML
40 INJECTION, EMULSION INTRAVENOUS
Status: DISCONTINUED | OUTPATIENT
Start: 2024-12-28 | End: 2024-12-28 | Stop reason: HOSPADM

## 2024-12-28 RX ORDER — PROPOFOL 10 MG/ML
80 INJECTION, EMULSION INTRAVENOUS ONCE
Status: COMPLETED | OUTPATIENT
Start: 2024-12-28 | End: 2024-12-28

## 2024-12-28 RX ORDER — SODIUM CHLORIDE 9 MG/ML
INJECTION, SOLUTION INTRAVENOUS CONTINUOUS
Status: DISCONTINUED | OUTPATIENT
Start: 2024-12-28 | End: 2024-12-28 | Stop reason: HOSPADM

## 2024-12-28 RX ORDER — IBUPROFEN 200 MG
600 TABLET ORAL 3 TIMES DAILY
Qty: 45 TABLET | Refills: 0 | Status: SHIPPED | OUTPATIENT
Start: 2024-12-28 | End: 2025-01-02

## 2024-12-28 RX ORDER — FLUMAZENIL 0.1 MG/ML
0.2 INJECTION, SOLUTION INTRAVENOUS
Status: DISCONTINUED | OUTPATIENT
Start: 2024-12-28 | End: 2024-12-28 | Stop reason: HOSPADM

## 2024-12-28 RX ORDER — ONDANSETRON 2 MG/ML
4 INJECTION INTRAMUSCULAR; INTRAVENOUS ONCE
Status: COMPLETED | OUTPATIENT
Start: 2024-12-28 | End: 2024-12-28

## 2024-12-28 RX ORDER — OXYCODONE HYDROCHLORIDE 5 MG/1
5 TABLET ORAL EVERY 6 HOURS PRN
Qty: 16 TABLET | Refills: 0 | Status: SHIPPED | OUTPATIENT
Start: 2024-12-28

## 2024-12-28 RX ADMIN — HYDROMORPHONE HYDROCHLORIDE 1 MG: 1 INJECTION, SOLUTION INTRAMUSCULAR; INTRAVENOUS; SUBCUTANEOUS at 16:26

## 2024-12-28 RX ADMIN — IBUPROFEN 600 MG: 600 TABLET, FILM COATED ORAL at 18:55

## 2024-12-28 RX ADMIN — ONDANSETRON 4 MG: 2 INJECTION INTRAMUSCULAR; INTRAVENOUS at 16:29

## 2024-12-28 RX ADMIN — OXYCODONE 5 MG: 5 TABLET ORAL at 18:55

## 2024-12-28 RX ADMIN — PROPOFOL 80 MG: 10 INJECTION, EMULSION INTRAVENOUS at 18:08

## 2024-12-28 RX ADMIN — ONDANSETRON 4 MG: 2 INJECTION INTRAMUSCULAR; INTRAVENOUS at 17:25

## 2024-12-28 RX ADMIN — PROPOFOL 40 MG: 10 INJECTION, EMULSION INTRAVENOUS at 18:10

## 2024-12-28 RX ADMIN — SODIUM CHLORIDE: 9 INJECTION, SOLUTION INTRAVENOUS at 16:48

## 2024-12-28 ASSESSMENT — COLUMBIA-SUICIDE SEVERITY RATING SCALE - C-SSRS
1. IN THE PAST MONTH, HAVE YOU WISHED YOU WERE DEAD OR WISHED YOU COULD GO TO SLEEP AND NOT WAKE UP?: NO
2. HAVE YOU ACTUALLY HAD ANY THOUGHTS OF KILLING YOURSELF IN THE PAST MONTH?: NO
6. HAVE YOU EVER DONE ANYTHING, STARTED TO DO ANYTHING, OR PREPARED TO DO ANYTHING TO END YOUR LIFE?: NO

## 2024-12-28 ASSESSMENT — ACTIVITIES OF DAILY LIVING (ADL)
ADLS_ACUITY_SCORE: 41

## 2024-12-28 NOTE — Clinical Note
Mike Awan was seen and treated in our emergency department on 12/28/2024.  He may return to work on 01/02/2025.  Patient will be unable to use his right arm for 2 weeks     If you have any questions or concerns, please don't hesitate to call.      Mohamud Abarca MD

## 2024-12-28 NOTE — ED TRIAGE NOTES
Was playing basketball 30 mins and attempted to throw an overhead pass down the court, heard a crack in his right arm. Right shoulder looks visibly deformed.

## 2024-12-28 NOTE — ED PROVIDER NOTES
ED Provider Note  [facility]      History     Chief Complaint   Patient presents with    Shoulder Injury     HPI  Mike Awan is a 29 year old male who was playing basketball and attempted to throw a pass or inbound the ball overhead down the court when he heard a crack in his right arm.  The patient states that he thinks it might be dislocated, but has never dislocated his right shoulder before. He presents here for evaluation complaining of pain in his right shoulder.  The patient denies any other trauma, and states that this happened approximately 30 minutes prior to arrival.    This part of the medical record was transcribed by Nathan Ocampo, Medical Scribe, from a dictation done by Mohamud Abarca MD.     Past Medical History:   Diagnosis Date    Hypertension     has lost weight      Current Outpatient Medications   Medication Sig Dispense Refill    Acetaminophen (TYLENOL PO) Take 650 mg by mouth every 8 hours as needed for mild pain or fever Reported on 5/5/2017       No Known Allergies    Social History     Socioeconomic History    Marital status:      Spouse name: Not on file    Number of children: 0    Years of education: Not on file    Highest education level: Not on file   Occupational History    Not on file   Tobacco Use    Smoking status: Never    Smokeless tobacco: Never   Substance and Sexual Activity    Alcohol use: No    Drug use: No    Sexual activity: Never   Other Topics Concern    Parent/sibling w/ CABG, MI or angioplasty before 65F 55M? Not Asked   Social History Narrative    Not on file     Social Drivers of Health     Financial Resource Strain: Low Risk  (7/19/2023)    Received from [facility]    Financial Resource Strain     Difficulty of Paying Living Expenses: 3     Difficulty of Paying Living Expenses: Not on file   Food Insecurity: No Food Insecurity (7/19/2023)    Received from [employer]  "Affiliates    Food Insecurity     Worried About Running Out of Food in the Last Year: 1   Transportation Needs: No Transportation Needs (7/19/2023)    Received from [facility]    Transportation Needs     Lack of Transportation (Medical): 1   Physical Activity: Not on file   Stress: Not on file   Social Connections: Socially Integrated (7/19/2023)    Received from [facility]    Social Connections     Frequency of Communication with Friends and Family: 0   Interpersonal Safety: Not on file   Housing Stability: Low Risk  (7/19/2023)    Received from [facility] [employer] [facility]    Housing Stability     Unable to Pay for Housing in the Last Year: 1     No past surgical history on file.    Family History   Problem Relation Age of Onset    C.A.D. Father     Hypertension Father     Diabetes Father     Lipids Father     Glaucoma No family hx of     Macular Degeneration No family hx of        A medically appropriate review of systems was performed with pertinent positives and negatives noted in the HPI, and all other systems negative.    Physical Exam   BP: (!) 144/102  Pulse: 63  Temp: 98  F (36.7  C)  Resp: 16  Height: 190.5 cm (6' 3\")  Weight: 104.3 kg (230 lb)  SpO2: 100 %  Physical Exam  Vitals and nursing note reviewed.   Constitutional:       Appearance: He is diaphoretic.      Comments: Conversant pleasant but in some mild distress secondary to his right shoulder pain   HENT:      Head: Atraumatic.   Eyes:      Extraocular Movements: Extraocular movements intact.      Pupils: Pupils are equal, round, and reactive to light.   Cardiovascular:      Rate and Rhythm: Regular rhythm.      Heart sounds: Normal heart sounds.   Pulmonary:      Effort: No respiratory distress.   Musculoskeletal:      Cervical back: Neck supple.      Comments: Patient has a deformity of his right shoulder consistent with a shoulder dislocation.  Distal CMS is " intact in the right upper extremity.   Neurological:      General: No focal deficit present.      Mental Status: He is alert and oriented to person, place, and time.      Motor: No weakness.   Psychiatric:         Mood and Affect: Mood normal.         ED Course, Procedures, & Data      Orders Placed This Encounter   Procedures    Sedation    ED Orthopedic Injury Treatment - Upper Extremity    XR Shoulder Right 2 Views    XR Shoulder Right G/E 3 Views    Basic metabolic panel    CBC with platelets and differential    Orthopedic  Referral    Peripheral IV catheter    Cardiac Continuous Monitoring    End tidal CO2 Monitoring    Suction by RT / RN    Oxygen: Oxygen mask    CBC with platelets differential     Patient's initial x-ray after 1 mg of Dilaudid intravenously for pain control revealed an anterior shoulder dislocation so the patient was set up for procedural sedation and reduction in the stabilization room.    Results for orders placed or performed during the hospital encounter of 12/28/24 (from the past 24 hours)   CBC with platelets differential    Narrative    The following orders were created for panel order CBC with platelets differential.  Procedure                               Abnormality         Status                     ---------                               -----------         ------                     CBC with platelets and d...[[id number]]                      Final result                 Please view results for these tests on the individual orders.   Basic metabolic panel   Result Value Ref Range    Sodium 142 135 - 145 mmol/L    Potassium 4.6 3.4 - 5.3 mmol/L    Chloride 103 98 - 107 mmol/L    Carbon Dioxide (CO2) 24 22 - 29 mmol/L    Anion Gap 15 7 - 15 mmol/L    Urea Nitrogen 14.0 6.0 - 20.0 mg/dL    Creatinine 1.03 0.67 - 1.17 mg/dL    GFR Estimate >90 >60 mL/min/1.73m2    Calcium 9.8 8.8 - 10.4 mg/dL    Glucose 99 70 - 99 mg/dL   CBC with platelets and differential   Result Value  Ref Range    WBC Count 5.4 4.0 - 11.0 10e3/uL    RBC Count 5.36 4.40 - 5.90 10e6/uL    Hemoglobin 16.5 13.3 - 17.7 g/dL    Hematocrit 47.5 40.0 - 53.0 %    MCV 89 78 - 100 fL    MCH 30.8 26.5 - 33.0 pg    MCHC 34.7 31.5 - 36.5 g/dL    RDW 12.6 10.0 - 15.0 %    Platelet Count 246 150 - 450 10e3/uL    % Neutrophils 35 %    % Lymphocytes 56 %    % Monocytes 6 %    % Eosinophils 2 %    % Basophils 1 %    % Immature Granulocytes 0 %    NRBCs per 100 WBC 0 <1 /100    Absolute Neutrophils 1.9 1.6 - 8.3 10e3/uL    Absolute Lymphocytes 3.1 0.8 - 5.3 10e3/uL    Absolute Monocytes 0.3 0.0 - 1.3 10e3/uL    Absolute Eosinophils 0.1 0.0 - 0.7 10e3/uL    Absolute Basophils 0.0 0.0 - 0.2 10e3/uL    Absolute Immature Granulocytes 0.0 <=0.4 10e3/uL    Absolute NRBCs 0.0 10e3/uL   XR Shoulder Right 2 Views    Narrative    EXAM: XR SHOULDER RIGHT 2 VIEWS  LOCATION: [facility]  DATE: 12/28/2024    INDICATION: probable dislocation  COMPARISON: None.      Impression    IMPRESSION: Anterior inferior dislocation of the right glenohumeral joint. No definite acute fracture is identified, however repeat radiographs post reduction are recommended.   Sedation    Narrative    Erin Hollingsworth MD     12/28/2024  6:20 PM  [facility]    Procedure: Sedation    Date/Time: 12/28/2024 6:18 PM    Performed by: Erin Hollingsworth MD  Authorized by: Erin Hollingsworth MD    Risks, benefits and alternatives discussed.    ED EVALUATION:      Assessment Time: 12/28/2024 6:18 PM      I have performed an Emergency Department Evaluation including taking a   history and physical examination, this evaluation will be documented in   the electronic medical record for this ED encounter.      ASA Class: Class 1- healthy patient    Mallampati: Grade 2- soft palate, base of uvula, tonsillar pillars, and   portion of posterior pharyngeal wall visible    UNIVERSAL  PROTOCOL   Site Marked: NA  Prior Images Obtained and Reviewed:  NA  Required items: Required blood products, implants, devices and special   equipment available    Patient identity confirmed:  Arm band  Patient was reevaluated immediately before administering moderate or deep   sedation or anesthesia  Confirmation Checklist:  Patient's identity using two indicators  Time out: Immediately prior to the procedure a time out was called    Universal Protocol: the Joint Commission Universal Protocol was followed      SEDATION  Patient Sedated: Yes    Sedation:  Propofol  Vital signs: Vital signs monitored during sedation      PROCEDURE    Patient Tolerance:  Patient tolerated the procedure well with no immediate   complications  Length of time physician/provider present for 1:1 monitoring during   sedation: 10   XR Shoulder Right G/E 3 Views    Narrative    EXAM: XR SHOULDER RIGHT G/E 3 VIEWS  LOCATION: [facility]  DATE: 12/28/2024    INDICATION: Postreduction evaluation.  COMPARISON: None.      Impression    IMPRESSION: Normal glenohumeral joint alignment after closed reduction. No fracture visualized. No other bone or joint abnormality.     Patient was given a dose of Zofran as well presedation in order to prevent nausea    [facility]    -Dislocation - Upper Extremity    Date/Time: 12/28/2024 7:51 PM    Performed by: Mohamud Abarca MD  Authorized by: Mohamud Abarca MD    Emergent condition/consent implied      LOCATION     Location:  Shoulder    Shoulder location:  R shoulder    Shoulder dislocation type: anterior      PRE PROCEDURE ASSESSMENT      Pre-procedure imaging:  X-ray    Imaging findings: dislocation present      Imaging findings: no fracture      Fracture of greater humeral tuberosity: no      Hill-Sachs deformity: no      Distal perfusion: normal      ANESTHESIA (see MAR for exact dosages)       Anesthesia method:  None    PROCEDURE DETAILS      Manipulation performed: yes      Shoulder reduction method:  Direct traction    Reduction successful: yes      Reduction confirmed with imaging: yes      Immobilization: shoulder immobilizer.    POST PROCEDURE DETAILS     Neurological function: normal      Distal perfusion: normal      Range of motion: improved        PROCEDURE    Patient Tolerance:  Patient tolerated the procedure well with no immediate complications   Patient underwent procedural sedation with propofol and I refer you to my partners note for more information regarding that but the patient did receive an initial 80 mg bolus of propofol and then quired only 1 pump of 40 mg for enough sedation to easily reduce the shoulder.            Medications   sodium chloride (PF) 0.9% PF flush 3 mL (3 mLs Intracatheter Not Given 12/28/24 1927)   sodium chloride (PF) 0.9% PF flush 3 mL (3 mLs Intracatheter $Given 12/28/24 1649)   sodium chloride 0.9% BOLUS 500 mL (has no administration in time range)   sodium chloride 0.9 % infusion (0 mLs Intravenous Stopped 12/28/24 1954)   HYDROmorphone (DILAUDID) injection 1 mg (1 mg Intravenous $Given 12/28/24 1626)   ondansetron (ZOFRAN) injection 4 mg (4 mg Intravenous $Given 12/28/24 1629)   flumazenil (ROMAZICON) injection 0.2 mg (has no administration in time range)   propofol (DIPRIVAN) injection 10 mg/mL vial (40 mg Intravenous $Given 12/28/24 1810)   propofol (DIPRIVAN) injection 10 mg/mL vial (80 mg Intravenous $Given 12/28/24 1808)   ondansetron (ZOFRAN) injection 4 mg (4 mg Intravenous $Given 12/28/24 1725)   oxyCODONE (ROXICODONE) tablet 5 mg (5 mg Oral $Given 12/28/24 1855)   ibuprofen (ADVIL/MOTRIN) tablet 600 mg (600 mg Oral $Given 12/28/24 1855)       Critical care was not performed.     Medical Decision Making  The patient's presentation was of moderate complexity (shoulder dislocation requiring procedural sedation).    The patient's evaluation  involved:  ordering and/or review of 3+ test(s) in this encounter (see above)    The patient's management necessitated high risk (drug therapy requiring intensive monitoring (propofol)).    Assessment & Plan      I have reviewed the nursing notes.       I have reviewed the findings, diagnosis, plan and need for follow up with the patient.    New Prescriptions    IBUPROFEN (ADVIL/MOTRIN) 200 MG TABLET    Take 3 tablets (600 mg) by mouth 3 times daily for 5 days.    OXYCODONE (ROXICODONE) 5 MG TABLET    Take 1 tablet (5 mg) by mouth every 6 hours as needed for moderate to severe pain.       Final diagnoses:   Shoulder dislocation, right, initial encounter - anterior, reduced     Wear the shoulder immobilizer is much as possible even while sleeping for the next 2 weeks.  You may remove it gently for bathing.    Fill your prescriptions and take as directed.    You may use ice to your shoulder over the next 48 hours for comfort.    Referral to orthopedics has been placed into the computer system for you to be seen in their clinic in approximately 2 weeks.  They should call you in the next 48 hours to help you set up that appointment.  If they do not call you please call them at [phone].    Yes, this means no basketball right now ;)    Work note given      Mohamud Abarca Md  [facility] EMERGENCY DEPARTMENT  12/28/2024     Mohamud Abarca MD  12/28/24 2003

## 2024-12-29 NOTE — ED PROVIDER NOTES
ED Provider Note    [facility]          This note is to document the procedural sedation done on this patient.  I provided sedation monitoring and Dr. Abarca performed reduction of dislocated shoulder.  Patient was placed on monitoring equipment including end-tidal CO2.  Advanced airway equipment was available at the bedside.  Suction was available at the bedside.  Patient was given an initial dose of propofol 80 mg IV, followed by 1 more bolus of propofol 40 mg IV.  Adequate sedation was achieved.  Procedure was easily accomplished.  He was monitored until he was awake.  No complications with the procedure    ED Course, Procedures, & Data      [facility]    Procedure: Sedation    Date/Time: 12/28/2024 6:18 PM    Performed by: Erin Hollingsworth MD  Authorized by: Erin Hollingsworth MD    Risks, benefits and alternatives discussed.    ED EVALUATION:      Assessment Time: 12/28/2024 6:18 PM      I have performed an Emergency Department Evaluation including taking a history and physical examination, this evaluation will be documented in the electronic medical record for this ED encounter.      ASA Class: Class 1- healthy patient    Mallampati: Grade 2- soft palate, base of uvula, tonsillar pillars, and portion of posterior pharyngeal wall visible    UNIVERSAL PROTOCOL   Site Marked: NA  Prior Images Obtained and Reviewed:  NA  Required items: Required blood products, implants, devices and special equipment available    Patient identity confirmed:  Arm band  Patient was reevaluated immediately before administering moderate or deep sedation or anesthesia  Confirmation Checklist:  Patient's identity using two indicators  Time out: Immediately prior to the procedure a time out was called    Universal Protocol: the Joint Commission Universal Protocol was followed      SEDATION  Patient Sedated: Yes    Sedation:  Propofol  Vital signs: Vital  signs monitored during sedation      PROCEDURE    Patient Tolerance:  Patient tolerated the procedure well with no immediate complications  Length of time physician/provider present for 1:1 monitoring during sedation: 10            Erin Hollingsworth MD  12/28/24 5554

## 2024-12-29 NOTE — DISCHARGE INSTRUCTIONS
Wear the shoulder immobilizer is much as possible even while sleeping for the next 2 weeks.  You may remove it gently for bathing.    Fill your prescriptions and take as directed.    You may use ice to your shoulder over the next 48 hours for comfort.    Referral to orthopedics has been placed into the computer system for you to be seen in their clinic in approximately 2 weeks.  They should call you in the next 48 hours to help you set up that appointment.  If they do not call you please call them at [phone].    Yes, this means no basketball right now ;)

## 2025-01-05 ENCOUNTER — HEALTH MAINTENANCE LETTER (OUTPATIENT)
Age: 30
End: 2025-01-05